# Patient Record
Sex: FEMALE | Race: OTHER | NOT HISPANIC OR LATINO
[De-identification: names, ages, dates, MRNs, and addresses within clinical notes are randomized per-mention and may not be internally consistent; named-entity substitution may affect disease eponyms.]

---

## 2018-02-07 ENCOUNTER — TRANSCRIPTION ENCOUNTER (OUTPATIENT)
Age: 33
End: 2018-02-07

## 2018-05-30 ENCOUNTER — APPOINTMENT (OUTPATIENT)
Dept: RHEUMATOLOGY | Facility: CLINIC | Age: 33
End: 2018-05-30
Payer: MEDICAID

## 2018-05-30 VITALS
SYSTOLIC BLOOD PRESSURE: 130 MMHG | BODY MASS INDEX: 34.68 KG/M2 | HEIGHT: 59 IN | DIASTOLIC BLOOD PRESSURE: 91 MMHG | OXYGEN SATURATION: 98 % | WEIGHT: 172 LBS | RESPIRATION RATE: 16 BRPM | HEART RATE: 76 BPM | TEMPERATURE: 98.2 F

## 2018-05-30 DIAGNOSIS — Z82.61 FAMILY HISTORY OF ARTHRITIS: ICD-10-CM

## 2018-05-30 PROCEDURE — 99204 OFFICE O/P NEW MOD 45 MIN: CPT

## 2018-05-31 LAB
CCP AB SER IA-ACNC: <8 UNITS
CK SERPL-CCNC: 82 U/L
CRP SERPL-MCNC: 1 MG/DL
ERYTHROCYTE [SEDIMENTATION RATE] IN BLOOD BY WESTERGREN METHOD: 13 MM/HR
FERRITIN SERPL-MCNC: 73 NG/ML
FOLATE SERPL-MCNC: >20 NG/ML
IRON SATN MFR SERPL: 14 %
IRON SERPL-MCNC: 48 UG/DL
RF+CCP IGG SER-IMP: NEGATIVE
RHEUMATOID FACT SER QL: 11 IU/ML
TIBC SERPL-MCNC: 351 UG/DL
UIBC SERPL-MCNC: 303 UG/DL
VIT B12 SERPL-MCNC: 396 PG/ML

## 2018-06-03 LAB
DEPRECATED KAPPA LC FREE/LAMBDA SER: 0.6 RATIO
IGA SER QL IEP: 247 MG/DL
IGG SER QL IEP: 1517 MG/DL
IGM SER QL IEP: 82 MG/DL
KAPPA LC CSF-MCNC: 2.37 MG/DL
KAPPA LC SERPL-MCNC: 1.42 MG/DL
VIT C SERPL-MCNC: 0.7 MG/DL

## 2018-06-04 ENCOUNTER — TRANSCRIPTION ENCOUNTER (OUTPATIENT)
Age: 33
End: 2018-06-04

## 2018-06-06 ENCOUNTER — RX RENEWAL (OUTPATIENT)
Age: 33
End: 2018-06-06

## 2018-06-11 ENCOUNTER — APPOINTMENT (OUTPATIENT)
Dept: ENDOCRINOLOGY | Facility: CLINIC | Age: 33
End: 2018-06-11
Payer: MEDICAID

## 2018-06-11 VITALS
DIASTOLIC BLOOD PRESSURE: 70 MMHG | SYSTOLIC BLOOD PRESSURE: 120 MMHG | BODY MASS INDEX: 32.46 KG/M2 | WEIGHT: 161 LBS | OXYGEN SATURATION: 98 % | HEIGHT: 59 IN | HEART RATE: 85 BPM

## 2018-06-11 PROCEDURE — 99205 OFFICE O/P NEW HI 60 MIN: CPT

## 2018-06-14 LAB
ENA RNP AB SER IA-ACNC: <0.2 AL
ENA SM AB SER IA-ACNC: <0.2 AL
ENA SS-A AB SER IA-ACNC: <0.2 AL
ENA SS-B AB SER IA-ACNC: <0.2 AL

## 2018-06-15 LAB
ANA SER IF-ACNC: NEGATIVE
DSDNA AB SER-ACNC: <12 IU/ML

## 2018-06-19 LAB
17OHP SERPL-MCNC: 15 NG/DL
CHOLEST SERPL-MCNC: 241 MG/DL
CHOLEST/HDLC SERPL: 7.3 RATIO
DHEA-SULFATE, SERUM: 189 UG/DL
FSH: 5.8 MIU/ML
HBA1C MFR BLD HPLC: 5.5 %
HDLC SERPL-MCNC: 33 MG/DL
LDLC SERPL CALC-MCNC: 144 MG/DL
LH SERPL-ACNC: 5.6 IU/L
PROLACTIN SERPL-MCNC: 9.4 NG/ML
TESTOST BND SERPL-MCNC: 0.9 PG/ML
TESTOST SERPL-MCNC: 6.4 NG/DL
TRIGL SERPL-MCNC: 320 MG/DL
TSH SERPL-ACNC: 1.65 UIU/ML

## 2018-07-03 ENCOUNTER — APPOINTMENT (OUTPATIENT)
Dept: OBGYN | Facility: CLINIC | Age: 33
End: 2018-07-03
Payer: MEDICAID

## 2018-07-03 VITALS
SYSTOLIC BLOOD PRESSURE: 114 MMHG | BODY MASS INDEX: 32.25 KG/M2 | HEIGHT: 59 IN | WEIGHT: 160 LBS | DIASTOLIC BLOOD PRESSURE: 70 MMHG

## 2018-07-03 PROCEDURE — 99395 PREV VISIT EST AGE 18-39: CPT

## 2018-07-03 PROCEDURE — 36415 COLL VENOUS BLD VENIPUNCTURE: CPT

## 2018-07-04 LAB
ALBUMIN SERPL ELPH-MCNC: 4.5 G/DL
ALP BLD-CCNC: 102 U/L
ALT SERPL-CCNC: 66 U/L
ANION GAP SERPL CALC-SCNC: 18 MMOL/L
AST SERPL-CCNC: 75 U/L
BASOPHILS # BLD AUTO: 0.02 K/UL
BASOPHILS NFR BLD AUTO: 0.2 %
BILIRUB SERPL-MCNC: 0.3 MG/DL
BUN SERPL-MCNC: 8 MG/DL
CALCIUM SERPL-MCNC: 9.7 MG/DL
CHLORIDE SERPL-SCNC: 103 MMOL/L
CO2 SERPL-SCNC: 24 MMOL/L
CREAT SERPL-MCNC: 0.65 MG/DL
EOSINOPHIL # BLD AUTO: 0.35 K/UL
EOSINOPHIL NFR BLD AUTO: 3.9 %
ESTRADIOL SERPL-MCNC: 135 PG/ML
FSH SERPL-MCNC: 2.6 IU/L
GLUCOSE SERPL-MCNC: 78 MG/DL
HBA1C MFR BLD HPLC: 5.3 %
HBV SURFACE AG SER QL: NONREACTIVE
HCT VFR BLD CALC: 38.7 %
HCV AB SER QL: NONREACTIVE
HCV S/CO RATIO: 0.15 S/CO
HGB BLD-MCNC: 13 G/DL
HIV1+2 AB SPEC QL IA.RAPID: NONREACTIVE
IMM GRANULOCYTES NFR BLD AUTO: 0.1 %
INSULIN SERPL-MCNC: 126.7 UU/ML
LH SERPL-ACNC: 7.9 IU/L
LYMPHOCYTES # BLD AUTO: 2.8 K/UL
LYMPHOCYTES NFR BLD AUTO: 31.5 %
MAN DIFF?: NORMAL
MCHC RBC-ENTMCNC: 28.8 PG
MCHC RBC-ENTMCNC: 33.6 GM/DL
MCV RBC AUTO: 85.8 FL
MONOCYTES # BLD AUTO: 0.47 K/UL
MONOCYTES NFR BLD AUTO: 5.3 %
NEUTROPHILS # BLD AUTO: 5.25 K/UL
NEUTROPHILS NFR BLD AUTO: 59 %
PLATELET # BLD AUTO: 224 K/UL
POTASSIUM SERPL-SCNC: 3.9 MMOL/L
PROGEST SERPL-MCNC: 6.7 NG/ML
PROLACTIN SERPL-MCNC: 34.2 NG/ML
PROT SERPL-MCNC: 7.7 G/DL
RBC # BLD: 4.51 M/UL
RBC # FLD: 13.6 %
SODIUM SERPL-SCNC: 145 MMOL/L
T PALLIDUM AB SER QL IA: NEGATIVE
WBC # FLD AUTO: 8.9 K/UL

## 2018-07-05 DIAGNOSIS — E22.1 HYPERPROLACTINEMIA: ICD-10-CM

## 2018-07-05 LAB
C TRACH RRNA SPEC QL NAA+PROBE: NOT DETECTED
HPV HIGH+LOW RISK DNA PNL CVX: NOT DETECTED
N GONORRHOEA RRNA SPEC QL NAA+PROBE: NOT DETECTED
SOURCE AMPLIFICATION: NORMAL
SOURCE TP AMPLIFICATION: NORMAL
T VAGINALIS RRNA SPEC QL NAA+PROBE: NOT DETECTED
T4 FREE SERPL-MCNC: 1.2 NG/DL
TESTOST SERPL-MCNC: 25.9 NG/DL
TSH SERPL-ACNC: 1.61 UIU/ML

## 2018-07-05 RX ORDER — METFORMIN HYDROCHLORIDE 500 MG/1
500 TABLET, COATED ORAL TWICE DAILY
Qty: 120 | Refills: 0 | Status: DISCONTINUED | COMMUNITY
Start: 2018-07-04 | End: 2018-07-05

## 2018-07-06 LAB — DHEA-S SERPL-MCNC: 256 UG/DL

## 2018-07-08 ENCOUNTER — OUTPATIENT (OUTPATIENT)
Dept: OUTPATIENT SERVICES | Facility: HOSPITAL | Age: 33
LOS: 1 days | End: 2018-07-08
Payer: MEDICAID

## 2018-07-08 ENCOUNTER — APPOINTMENT (OUTPATIENT)
Dept: MRI IMAGING | Facility: CLINIC | Age: 33
End: 2018-07-08
Payer: MEDICAID

## 2018-07-08 DIAGNOSIS — E22.1 HYPERPROLACTINEMIA: ICD-10-CM

## 2018-07-08 PROCEDURE — A9585: CPT

## 2018-07-08 PROCEDURE — 70553 MRI BRAIN STEM W/O & W/DYE: CPT | Mod: 26

## 2018-07-08 PROCEDURE — 70553 MRI BRAIN STEM W/O & W/DYE: CPT

## 2018-07-09 LAB — CYTOLOGY CVX/VAG DOC THIN PREP: NORMAL

## 2018-07-24 ENCOUNTER — RX RENEWAL (OUTPATIENT)
Age: 33
End: 2018-07-24

## 2018-07-24 ENCOUNTER — APPOINTMENT (OUTPATIENT)
Dept: OBGYN | Facility: CLINIC | Age: 33
End: 2018-07-24
Payer: MEDICAID

## 2018-07-24 VITALS
SYSTOLIC BLOOD PRESSURE: 120 MMHG | WEIGHT: 162 LBS | DIASTOLIC BLOOD PRESSURE: 80 MMHG | BODY MASS INDEX: 32.66 KG/M2 | HEIGHT: 59 IN

## 2018-07-24 DIAGNOSIS — N91.2 AMENORRHEA, UNSPECIFIED: ICD-10-CM

## 2018-07-24 DIAGNOSIS — Z30.432 ENCOUNTER FOR REMOVAL OF INTRAUTERINE CONTRACEPTIVE DEVICE: ICD-10-CM

## 2018-07-24 DIAGNOSIS — Z30.430 ENCOUNTER FOR INSERTION OF INTRAUTERINE CONTRACEPTIVE DEVICE: ICD-10-CM

## 2018-07-24 PROCEDURE — 81025 URINE PREGNANCY TEST: CPT

## 2018-07-24 PROCEDURE — 76830 TRANSVAGINAL US NON-OB: CPT

## 2018-07-24 PROCEDURE — 58300 INSERT INTRAUTERINE DEVICE: CPT

## 2018-07-25 LAB
ALBUMIN SERPL ELPH-MCNC: 4.4 G/DL
ALDOLASE SERPL-CCNC: 8.5 U/L
ALP BLD-CCNC: 100 U/L
ALT SERPL-CCNC: 45 U/L
AST SERPL-CCNC: 51 U/L
BILIRUB DIRECT SERPL-MCNC: 0.1 MG/DL
BILIRUB INDIRECT SERPL-MCNC: 0.2 MG/DL
BILIRUB SERPL-MCNC: 0.3 MG/DL
CK SERPL-CCNC: 86 U/L
CRP SERPL-MCNC: 0.76 MG/DL
ERYTHROCYTE [SEDIMENTATION RATE] IN BLOOD BY WESTERGREN METHOD: 15 MM/HR
MYOGLOBIN SERPL-MCNC: <21 NG/ML
PROT SERPL-MCNC: 7.3 G/DL
SMOOTH MUSCLE AB SER QL IF: NORMAL

## 2018-07-26 ENCOUNTER — LABORATORY RESULT (OUTPATIENT)
Age: 33
End: 2018-07-26

## 2018-07-26 ENCOUNTER — APPOINTMENT (OUTPATIENT)
Dept: GASTROENTEROLOGY | Facility: CLINIC | Age: 33
End: 2018-07-26
Payer: MEDICAID

## 2018-07-26 VITALS
HEIGHT: 59 IN | DIASTOLIC BLOOD PRESSURE: 91 MMHG | WEIGHT: 162 LBS | HEART RATE: 72 BPM | SYSTOLIC BLOOD PRESSURE: 125 MMHG | BODY MASS INDEX: 32.66 KG/M2

## 2018-07-26 DIAGNOSIS — Z83.79 FAMILY HISTORY OF OTHER DISEASES OF THE DIGESTIVE SYSTEM: ICD-10-CM

## 2018-07-26 PROBLEM — Z30.430 ENCOUNTER FOR IUD INSERTION: Status: ACTIVE | Noted: 2018-07-26

## 2018-07-26 LAB
HCG UR QL: NEGATIVE
QUALITY CONTROL: YES

## 2018-07-26 PROCEDURE — 99205 OFFICE O/P NEW HI 60 MIN: CPT | Mod: 25

## 2018-07-26 PROCEDURE — 36415 COLL VENOUS BLD VENIPUNCTURE: CPT

## 2018-07-26 RX ORDER — CHROMIUM 200 MCG
TABLET ORAL
Refills: 0 | Status: DISCONTINUED | COMMUNITY
End: 2018-07-26

## 2018-08-03 LAB
A1AT SERPL-MCNC: 133 MG/DL
CERULOPLASMIN SERPL-MCNC: 45 MG/DL
MITOCHONDRIA AB SER IF-ACNC: NORMAL
TTG IGA SER IA-ACNC: <5 UNITS
TTG IGA SER-ACNC: NEGATIVE
TTG IGG SER IA-ACNC: <5 UNITS
TTG IGG SER IA-ACNC: NEGATIVE

## 2018-09-04 ENCOUNTER — APPOINTMENT (OUTPATIENT)
Dept: OBGYN | Facility: CLINIC | Age: 33
End: 2018-09-04

## 2018-12-11 ENCOUNTER — APPOINTMENT (OUTPATIENT)
Dept: ENDOCRINOLOGY | Facility: CLINIC | Age: 33
End: 2018-12-11

## 2019-04-13 ENCOUNTER — TRANSCRIPTION ENCOUNTER (OUTPATIENT)
Age: 34
End: 2019-04-13

## 2020-02-25 ENCOUNTER — APPOINTMENT (OUTPATIENT)
Dept: OBGYN | Facility: CLINIC | Age: 35
End: 2020-02-25
Payer: COMMERCIAL

## 2020-02-25 VITALS
WEIGHT: 169 LBS | BODY MASS INDEX: 34.07 KG/M2 | SYSTOLIC BLOOD PRESSURE: 126 MMHG | HEIGHT: 59 IN | DIASTOLIC BLOOD PRESSURE: 78 MMHG

## 2020-02-25 DIAGNOSIS — Z86.19 PERSONAL HISTORY OF OTHER INFECTIOUS AND PARASITIC DISEASES: ICD-10-CM

## 2020-02-25 DIAGNOSIS — Z01.419 ENCOUNTER FOR GYNECOLOGICAL EXAMINATION (GENERAL) (ROUTINE) W/OUT ABNORMAL FINDINGS: ICD-10-CM

## 2020-02-25 DIAGNOSIS — Z30.9 ENCOUNTER FOR CONTRACEPTIVE MANAGEMENT, UNSPECIFIED: ICD-10-CM

## 2020-02-25 PROCEDURE — 36415 COLL VENOUS BLD VENIPUNCTURE: CPT

## 2020-02-25 PROCEDURE — 99395 PREV VISIT EST AGE 18-39: CPT

## 2020-02-28 LAB
ALBUMIN SERPL ELPH-MCNC: 4.5 G/DL
ALP BLD-CCNC: 102 U/L
ALT SERPL-CCNC: 55 U/L
ANION GAP SERPL CALC-SCNC: 14 MMOL/L
AST SERPL-CCNC: 53 U/L
BASOPHILS # BLD AUTO: 0.05 K/UL
BASOPHILS NFR BLD AUTO: 0.5 %
BILIRUB SERPL-MCNC: 0.2 MG/DL
BUN SERPL-MCNC: 11 MG/DL
C TRACH RRNA SPEC QL NAA+PROBE: NOT DETECTED
CALCIUM SERPL-MCNC: 9.4 MG/DL
CHLORIDE SERPL-SCNC: 105 MMOL/L
CO2 SERPL-SCNC: 23 MMOL/L
CREAT SERPL-MCNC: 0.73 MG/DL
DHEA-S SERPL-MCNC: 224 UG/DL
EOSINOPHIL # BLD AUTO: 0.19 K/UL
EOSINOPHIL NFR BLD AUTO: 1.9 %
ESTIMATED AVERAGE GLUCOSE: 108 MG/DL
ESTRADIOL SERPL-MCNC: 59 PG/ML
FSH SERPL-MCNC: 5.9 IU/L
GLUCOSE SERPL-MCNC: 105 MG/DL
HBA1C MFR BLD HPLC: 5.4 %
HBV SURFACE AG SER QL: NONREACTIVE
HCT VFR BLD CALC: 43.6 %
HCV AB SER QL: NONREACTIVE
HCV S/CO RATIO: 0.2 S/CO
HGB BLD-MCNC: 14.2 G/DL
HIV1+2 AB SPEC QL IA.RAPID: NONREACTIVE
HPV HIGH+LOW RISK DNA PNL CVX: NOT DETECTED
IMM GRANULOCYTES NFR BLD AUTO: 0.3 %
INSULIN SERPL-MCNC: 147 UU/ML
LH SERPL-ACNC: 5.7 IU/L
LYMPHOCYTES # BLD AUTO: 3.42 K/UL
LYMPHOCYTES NFR BLD AUTO: 34.4 %
MAN DIFF?: NORMAL
MCHC RBC-ENTMCNC: 28.3 PG
MCHC RBC-ENTMCNC: 32.6 GM/DL
MCV RBC AUTO: 86.9 FL
MONOCYTES # BLD AUTO: 0.72 K/UL
MONOCYTES NFR BLD AUTO: 7.3 %
N GONORRHOEA RRNA SPEC QL NAA+PROBE: NOT DETECTED
NEUTROPHILS # BLD AUTO: 5.52 K/UL
NEUTROPHILS NFR BLD AUTO: 55.6 %
PLATELET # BLD AUTO: 250 K/UL
POTASSIUM SERPL-SCNC: 4.2 MMOL/L
PROGEST SERPL-MCNC: 0.1 NG/ML
PROLACTIN SERPL-MCNC: 20.6 NG/ML
PROT SERPL-MCNC: 7.6 G/DL
RBC # BLD: 5.02 M/UL
RBC # FLD: 13.2 %
SODIUM SERPL-SCNC: 142 MMOL/L
SOURCE AMPLIFICATION: NORMAL
SOURCE TP AMPLIFICATION: NORMAL
T PALLIDUM AB SER QL IA: NEGATIVE
T VAGINALIS RRNA SPEC QL NAA+PROBE: NOT DETECTED
T4 FREE SERPL-MCNC: 1.3 NG/DL
TESTOST SERPL-MCNC: 14.1 NG/DL
TSH SERPL-ACNC: 2.07 UIU/ML
WBC # FLD AUTO: 9.93 K/UL

## 2020-02-28 NOTE — REVIEW OF SYSTEMS
[Feeling Tired] : feeling tired [Recent Wt Gain ___ Lbs] : recent [unfilled] ~Ulb weight gain [Joint Pain] : joint pain [Nl] : Hematologic/Lymphatic

## 2020-02-28 NOTE — PHYSICAL EXAM
[Awake] : awake [Alert] : alert [Acute Distress] : no acute distress [Mass] : no breast mass [Nipple Discharge] : no nipple discharge [Axillary LAD] : no axillary lymphadenopathy [Soft] : soft [Tender] : non tender [Oriented x3] : oriented to person, place, and time [Normal] : uterus [Moderate] : moderate [Discharge] : a  ~M vaginal discharge was present [White] : white [Cheesy] : cheesy [No Bleeding] : there was no active vaginal bleeding [Uterine Adnexae] : were not tender and not enlarged [External Hemorrhoid] : no external hemorrhoids

## 2020-02-29 LAB — CYTOLOGY CVX/VAG DOC THIN PREP: NORMAL

## 2020-03-23 ENCOUNTER — APPOINTMENT (OUTPATIENT)
Dept: OBGYN | Facility: CLINIC | Age: 35
End: 2020-03-23
Payer: COMMERCIAL

## 2020-03-23 VITALS
BODY MASS INDEX: 33.67 KG/M2 | WEIGHT: 167 LBS | DIASTOLIC BLOOD PRESSURE: 82 MMHG | SYSTOLIC BLOOD PRESSURE: 122 MMHG | HEIGHT: 59 IN

## 2020-03-23 DIAGNOSIS — Z30.432 ENCOUNTER FOR REMOVAL OF INTRAUTERINE CONTRACEPTIVE DEVICE: ICD-10-CM

## 2020-03-23 LAB
HCG UR QL: NEGATIVE
QUALITY CONTROL: YES

## 2020-03-23 PROCEDURE — 58301 REMOVE INTRAUTERINE DEVICE: CPT

## 2020-03-23 PROCEDURE — 81025 URINE PREGNANCY TEST: CPT

## 2020-03-23 RX ORDER — TERCONAZOLE 8 MG/G
0.8 CREAM VAGINAL
Qty: 1 | Refills: 0 | Status: DISCONTINUED | COMMUNITY
Start: 2020-02-25 | End: 2020-03-23

## 2020-03-23 NOTE — PROCEDURE
[IUD Removal] : IUD [Mirena] : Mirena [DUB] : dysfunctional uterine bleeding [Patient] : patient [Risks] : risks [Benefits] : benefits [Alternatives] : alternatives [Consent Obtained] : consent was obtained prior to the procedure and is detailed in the patient's record [Speculum Placed] : a speculum was placed in the vagina [Strings Visualized] : the IUD strings were visualized [IUD Removed - Forceps] : the strings were grasped with forceps and the IUD was removed [IUD Discarded] : discarded [Tolerated Well] : the patient tolerated the procedure well [No Complications] : none [Heavy Vaginal Bleeding] : for heavy vaginal bleeding [Pelvic Pain] : for pelvic pain [PRN] : as needed

## 2020-10-20 ENCOUNTER — APPOINTMENT (OUTPATIENT)
Dept: ORTHOPEDIC SURGERY | Facility: CLINIC | Age: 35
End: 2020-10-20
Payer: COMMERCIAL

## 2020-10-20 VITALS — WEIGHT: 173 LBS | HEIGHT: 59 IN | BODY MASS INDEX: 34.88 KG/M2

## 2020-10-20 PROCEDURE — 73110 X-RAY EXAM OF WRIST: CPT | Mod: LT

## 2020-10-20 PROCEDURE — 20612 ASPIRATE/INJ GANGLION CYST: CPT | Mod: LT

## 2020-10-20 PROCEDURE — 99072 ADDL SUPL MATRL&STAF TM PHE: CPT

## 2020-10-20 PROCEDURE — 99203 OFFICE O/P NEW LOW 30 MIN: CPT | Mod: 25

## 2020-12-23 PROBLEM — Z86.19 HISTORY OF CANDIDIASIS OF VAGINA: Status: RESOLVED | Noted: 2020-02-25 | Resolved: 2020-12-23

## 2020-12-28 ENCOUNTER — APPOINTMENT (OUTPATIENT)
Dept: GASTROENTEROLOGY | Facility: CLINIC | Age: 35
End: 2020-12-28
Payer: COMMERCIAL

## 2020-12-28 VITALS
BODY MASS INDEX: 34.07 KG/M2 | WEIGHT: 169 LBS | HEART RATE: 78 BPM | HEIGHT: 59 IN | DIASTOLIC BLOOD PRESSURE: 90 MMHG | SYSTOLIC BLOOD PRESSURE: 144 MMHG | TEMPERATURE: 96.9 F

## 2020-12-28 DIAGNOSIS — R10.13 EPIGASTRIC PAIN: ICD-10-CM

## 2020-12-28 PROCEDURE — 36415 COLL VENOUS BLD VENIPUNCTURE: CPT

## 2020-12-28 PROCEDURE — 99072 ADDL SUPL MATRL&STAF TM PHE: CPT

## 2020-12-28 PROCEDURE — 99214 OFFICE O/P EST MOD 30 MIN: CPT | Mod: 25

## 2020-12-28 NOTE — HISTORY OF PRESENT ILLNESS
[Heartburn] : denies heartburn [Nausea] : denies nausea [Vomiting] : denies vomiting [Diarrhea] : denies diarrhea [Constipation] : denies constipation [Yellow Skin Or Eyes (Jaundice)] : denies jaundice [Abdominal Pain] : stable abdominal pain [Abdominal Swelling] : denies abdominal swelling [Rectal Pain] : denies rectal pain [_________] : Performed [unfilled] [de-identified] : Shante presents to the office today for evaluation of elevated liver enzymes.  She was last seen in the office for similar reasons in 2018.\par \par In 2018, she was noted have mildly elevated liver enzymes (AST, ALT: 40-70s) since 2016 which was attributed to NAFLD.  In February 2020, her AST was noted to be 53 with an ALT of 55.\par \par In October 2020, she went to see her PCP when she was having nausea and epigastric pain.  On October 1, she was found to have AST: 598, ALT: 409, Alk Phos: 192 with normal bilirubin.  On October 14, her enzymes mildly improved with AST: 506, ALT: 399 and Alk Phos: 177.  In November 2020, she underwent an abdominal US which was normal.  The patient has been using famotidine 40 mg at bedtime and reports that her upper abdominal pain has improved.  She denies any new diet, medication changes, herbal supplements, or excessive alcohol use.  Her weight has been relatively stable.  She uses intermittent Excedrin for migraines and stopped taking OCPs in February 2020.  On ROS, the patient does report subjective low grade fevers, fatigue, joint pain and stiffness. [de-identified] : normal liver, no gallstones

## 2020-12-28 NOTE — ASSESSMENT
[FreeTextEntry1] : 1.  Elevated liver enzymes with recent normal abdominal US.  History of fatty liver.  Previous workup unremarkable.  Acute hepatitis may be due to viral etiology.  Thrombosis, autoimmune disease less likely.\par 2.  Epigastric pain, likely secondary to GERD with esophagitis. DIfferential includes functional dyspepsia, symptomatic hiatus hernia, peptic ulcer, biliary colic (no stones seen on US).\par 3.  Obesity.\par 4.  History of PCOS.\par \par Recs:\par - Recent labs and US reviewed.\par - Liver enzymes, autoimmune labs repeated today.\par - The patient was counseled on diet and exercise for weight loss.\par - If liver enzymes remain elevated with negative workup, will check US with doppler to rule out PV/HV thombus.\par - The patient was counseled at length regarding fatty liver disease.  The spectrum of disease as well as the risk of disease progression to cirrhosis and its complications was reviewed.  The patient was advised that fatty liver disease may develop liver cancer without cirrhosis and therefore should be screened yearly with an abdominal US.  Fatty liver disease is commonly seen in patients with diabetes or those who are overweight.  The patient was advised that fatty liver may also be a precursor to the development of diabetes as it is part of the metabolic syndrome.  Treatment was reviewed and she was advised that the best therapy is diet and exercise.  The avoidance of fatty foods and alcohol was stressed, and a heart healthy diet was advised.\par

## 2020-12-28 NOTE — PHYSICAL EXAM
[General Appearance - Alert] : alert [General Appearance - In No Acute Distress] : in no acute distress [General Appearance - Well Nourished] : well nourished [Sclera] : the sclera and conjunctiva were normal [PERRL With Normal Accommodation] : pupils were equal in size, round, and reactive to light [Extraocular Movements] : extraocular movements were intact [Outer Ear] : the ears and nose were normal in appearance [Hearing Threshold Finger Rub Not [place]] : hearing was normal [Neck Appearance] : the appearance of the neck was normal [Neck Cervical Mass (___cm)] : no neck mass was observed [Auscultation Breath Sounds / Voice Sounds] : lungs were clear to auscultation bilaterally [Heart Rate And Rhythm] : heart rate was normal and rhythm regular [Heart Sounds] : normal S1 and S2 [Edema] : there was no peripheral edema [Bowel Sounds] : normal bowel sounds [Abdomen Soft] : soft [] : no hepato-splenomegaly [Abdomen Mass (___ Cm)] : no abdominal mass palpated [Abdomen Hernia] : no hernia was discovered [Cervical Lymph Nodes Enlarged Anterior Bilaterally] : anterior cervical [Supraclavicular Lymph Nodes Enlarged Bilaterally] : supraclavicular [No CVA Tenderness] : no ~M costovertebral angle tenderness [Abnormal Walk] : normal gait [Skin Color & Pigmentation] : normal skin color and pigmentation [No Focal Deficits] : no focal deficits [Oriented To Time, Place, And Person] : oriented to person, place, and time [FreeTextEntry1] : mild epigastric tenderness

## 2020-12-29 LAB
SARS-COV-2 IGG SERPL IA-ACNC: 0.06 INDEX
SARS-COV-2 IGG SERPL QL IA: NEGATIVE

## 2020-12-30 LAB
ALBUMIN SERPL ELPH-MCNC: 4.4 G/DL
ALP BLD-CCNC: 188 U/L
ALT SERPL-CCNC: 184 U/L
ANA PAT FLD IF-IMP: ABNORMAL
ANA SER IF-ACNC: ABNORMAL
AST SERPL-CCNC: 229 U/L
BILIRUB DIRECT SERPL-MCNC: 0.1 MG/DL
BILIRUB INDIRECT SERPL-MCNC: 0.2 MG/DL
BILIRUB SERPL-MCNC: 0.3 MG/DL
GGT SERPL-CCNC: 117 U/L
LKM AB SER QL IF: <20.1 UNITS
PROT SERPL-MCNC: 8 G/DL
TSH SERPL-ACNC: 3.47 UIU/ML
TTG IGA SER IA-ACNC: <1.2 U/ML
TTG IGA SER-ACNC: NEGATIVE
TTG IGG SER IA-ACNC: 8.5 U/ML
TTG IGG SER IA-ACNC: ABNORMAL

## 2021-01-04 LAB
MITOCHONDRIA AB SER IF-ACNC: NORMAL
SMOOTH MUSCLE AB SER QL IF: ABNORMAL

## 2021-02-03 ENCOUNTER — APPOINTMENT (OUTPATIENT)
Dept: RHEUMATOLOGY | Facility: CLINIC | Age: 36
End: 2021-02-03
Payer: COMMERCIAL

## 2021-02-03 VITALS
SYSTOLIC BLOOD PRESSURE: 124 MMHG | TEMPERATURE: 97.1 F | WEIGHT: 157 LBS | RESPIRATION RATE: 16 BRPM | BODY MASS INDEX: 31.65 KG/M2 | OXYGEN SATURATION: 98 % | DIASTOLIC BLOOD PRESSURE: 72 MMHG | HEART RATE: 79 BPM | HEIGHT: 59 IN

## 2021-02-03 DIAGNOSIS — M79.10 MYALGIA, UNSPECIFIED SITE: ICD-10-CM

## 2021-02-03 DIAGNOSIS — L65.9 NONSCARRING HAIR LOSS, UNSPECIFIED: ICD-10-CM

## 2021-02-03 PROCEDURE — 99214 OFFICE O/P EST MOD 30 MIN: CPT

## 2021-02-03 PROCEDURE — 99072 ADDL SUPL MATRL&STAF TM PHE: CPT

## 2021-02-05 ENCOUNTER — NON-APPOINTMENT (OUTPATIENT)
Age: 36
End: 2021-02-05

## 2021-02-05 PROBLEM — M79.10 MYALGIA: Status: ACTIVE | Noted: 2018-05-30

## 2021-02-05 PROBLEM — L65.9 HAIR LOSS: Status: ACTIVE | Noted: 2021-02-05

## 2021-02-18 ENCOUNTER — TRANSCRIPTION ENCOUNTER (OUTPATIENT)
Age: 36
End: 2021-02-18

## 2021-02-19 ENCOUNTER — NON-APPOINTMENT (OUTPATIENT)
Age: 36
End: 2021-02-19

## 2021-02-19 LAB
ACE BLD-CCNC: 24 U/L
ALBUMIN MFR SERPL ELPH: 57.5 %
ALBUMIN SERPL ELPH-MCNC: 4.8 G/DL
ALBUMIN SERPL-MCNC: 4.5 G/DL
ALBUMIN/GLOB SERPL: 1.4 RATIO
ALDOLASE SERPL-CCNC: 10.5 U/L
ALP BLD-CCNC: 113 U/L
ALPHA1 GLOB MFR SERPL ELPH: 3.2 %
ALPHA1 GLOB SERPL ELPH-MCNC: 0.2 G/DL
ALPHA2 GLOB MFR SERPL ELPH: 9.4 %
ALPHA2 GLOB SERPL ELPH-MCNC: 0.7 G/DL
ALT SERPL-CCNC: 68 U/L
ANA PAT FLD IF-IMP: ABNORMAL
ANA SER IF-ACNC: ABNORMAL
ANION GAP SERPL CALC-SCNC: 17 MMOL/L
APPEARANCE: CLEAR
AST SERPL-CCNC: 94 U/L
B-GLOBULIN MFR SERPL ELPH: 12.4 %
B-GLOBULIN SERPL ELPH-MCNC: 1 G/DL
BASOPHILS # BLD AUTO: 0.06 K/UL
BASOPHILS NFR BLD AUTO: 0.8 %
BILIRUB SERPL-MCNC: 0.4 MG/DL
BILIRUBIN URINE: NEGATIVE
BLOOD URINE: NEGATIVE
BUN SERPL-MCNC: 7 MG/DL
C3 SERPL-MCNC: 163 MG/DL
C4 SERPL-MCNC: 24 MG/DL
CALCIUM SERPL-MCNC: 10.1 MG/DL
CCP AB SER IA-ACNC: <8 UNITS
CHLORIDE SERPL-SCNC: 102 MMOL/L
CK SERPL-CCNC: 122 U/L
CO2 SERPL-SCNC: 23 MMOL/L
COLOR: COLORLESS
CREAT SERPL-MCNC: 0.77 MG/DL
CREAT SPEC-SCNC: 54 MG/DL
CREAT/PROT UR: 0.1 RATIO
CRP SERPL-MCNC: 0.72 MG/DL
DEPRECATED KAPPA LC FREE/LAMBDA SER: 0.68 RATIO
DSDNA AB SER-ACNC: <12 IU/ML
ENA RNP AB SER IA-ACNC: 0.3 AL
ENA SCL70 IGG SER IA-ACNC: <0.2 AL
ENA SM AB SER IA-ACNC: <0.2 AL
ENA SS-A AB SER IA-ACNC: <0.2 AL
ENA SS-B AB SER IA-ACNC: <0.2 AL
ENDOMYSIUM IGA SER QL: NEGATIVE
ENDOMYSIUM IGA TITR SER: NORMAL
EOSINOPHIL # BLD AUTO: 0.16 K/UL
EOSINOPHIL NFR BLD AUTO: 2 %
ERYTHROCYTE [SEDIMENTATION RATE] IN BLOOD BY WESTERGREN METHOD: 39 MM/HR
GAMMA GLOB FLD ELPH-MCNC: 1.4 G/DL
GAMMA GLOB MFR SERPL ELPH: 17.5 %
GGT SERPL-CCNC: 58 U/L
GLIADIN IGA SER QL: 5.6 UNITS
GLIADIN IGG SER QL: <5 UNITS
GLIADIN PEPTIDE IGA SER-ACNC: NEGATIVE
GLIADIN PEPTIDE IGG SER-ACNC: NEGATIVE
GLUCOSE QUALITATIVE U: NEGATIVE
GLUCOSE SERPL-MCNC: 58 MG/DL
HCT VFR BLD CALC: 43.3 %
HGB BLD-MCNC: 13.7 G/DL
IGA SER QL IEP: 272 MG/DL
IGG SER QL IEP: 1370 MG/DL
IGM SER QL IEP: 84 MG/DL
IMM GRANULOCYTES NFR BLD AUTO: 0.4 %
INTERPRETATION SERPL IEP-IMP: NORMAL
KAPPA LC CSF-MCNC: 2.7 MG/DL
KAPPA LC SERPL-MCNC: 1.84 MG/DL
KETONES URINE: NEGATIVE
LEUKOCYTE ESTERASE URINE: NEGATIVE
LYMPHOCYTES # BLD AUTO: 2.77 K/UL
LYMPHOCYTES NFR BLD AUTO: 34.9 %
M PROTEIN SPEC IFE-MCNC: NORMAL
MAN DIFF?: NORMAL
MCHC RBC-ENTMCNC: 27.8 PG
MCHC RBC-ENTMCNC: 31.6 GM/DL
MCV RBC AUTO: 87.8 FL
MONOCYTES # BLD AUTO: 0.51 K/UL
MONOCYTES NFR BLD AUTO: 6.4 %
MYOGLOBIN SERPL-MCNC: <21 NG/ML
NEUTROPHILS # BLD AUTO: 4.4 K/UL
NEUTROPHILS NFR BLD AUTO: 55.5 %
NITRITE URINE: NEGATIVE
PH URINE: 7
PLATELET # BLD AUTO: 231 K/UL
POTASSIUM SERPL-SCNC: 4.5 MMOL/L
PROT SERPL-MCNC: 7.8 G/DL
PROT UR-MCNC: 6 MG/DL
PROTEIN URINE: NEGATIVE
RBC # BLD: 4.93 M/UL
RBC # FLD: 12.5 %
RF+CCP IGG SER-IMP: NEGATIVE
RHEUMATOID FACT SER QL: <10 IU/ML
SMOOTH MUSCLE AB SER QL IF: ABNORMAL
SODIUM SERPL-SCNC: 142 MMOL/L
SPECIFIC GRAVITY URINE: 1.01
TSH SERPL-ACNC: 1.72 UIU/ML
TTG IGA SER IA-ACNC: 1.6 U/ML
TTG IGA SER-ACNC: NEGATIVE
TTG IGG SER IA-ACNC: 11.9 U/ML
TTG IGG SER IA-ACNC: POSITIVE
UROBILINOGEN URINE: NORMAL
WBC # FLD AUTO: 7.93 K/UL

## 2021-03-11 ENCOUNTER — APPOINTMENT (OUTPATIENT)
Dept: HEPATOLOGY | Facility: CLINIC | Age: 36
End: 2021-03-11
Payer: COMMERCIAL

## 2021-03-11 VITALS
OXYGEN SATURATION: 96 % | WEIGHT: 150.2 LBS | RESPIRATION RATE: 16 BRPM | SYSTOLIC BLOOD PRESSURE: 134 MMHG | TEMPERATURE: 98.4 F | HEART RATE: 100 BPM | HEIGHT: 59 IN | BODY MASS INDEX: 30.28 KG/M2 | DIASTOLIC BLOOD PRESSURE: 89 MMHG

## 2021-03-11 PROCEDURE — 99214 OFFICE O/P EST MOD 30 MIN: CPT

## 2021-03-11 PROCEDURE — 99072 ADDL SUPL MATRL&STAF TM PHE: CPT

## 2021-03-11 RX ORDER — PHENDIMETRAZINE TARTRATE 35 MG/1
35 TABLET ORAL
Refills: 0 | Status: ACTIVE | COMMUNITY

## 2021-03-11 RX ORDER — NORETHINDRONE ACETATE AND ETHINYL ESTRADIOL AND FERROUS FUMARATE 1.5-30(21)
1.5-3 KIT ORAL DAILY
Qty: 28 | Refills: 5 | Status: DISCONTINUED | COMMUNITY
Start: 2020-02-25 | End: 2021-03-11

## 2021-03-11 NOTE — ASSESSMENT
[FreeTextEntry1] : Ms. NOVAK is a 35 year old woman with\par \par - elevated liver enzymes with recent increase\par - elevated BROOKE and ASMA, normal IgG\par - obesity, lost 16 lbs since December\par - PCOS\par - liver not fatty-appearing on US\par - mild epigastric pain and tenderness, does not want to try PPI\par - took nitrofurantoin in 2018 ,which can cause autoimmune hepatitis, birth control Junel until early 2020. No recent medication to explain rise of LFTs. PHendimetrazine was started after peak of LFTs.\par - no FHx of liver disease. No neurologic/psychiatric illness. \par \par Plan:\par - labs as below\par - liver biopsy\par - return in 1 month

## 2021-03-11 NOTE — REVIEW OF SYSTEMS
[As Noted in HPI] : as noted in HPI [Feeling Tired] : feeling tired [Abdominal Pain] : abdominal pain [Constipation] : constipation [Diarrhea] : diarrhea [Negative] : Respiratory

## 2021-03-11 NOTE — PHYSICAL EXAM
[General Appearance - Alert] : alert [General Appearance - In No Acute Distress] : in no acute distress [Sclera] : the sclera and conjunctiva were normal [PERRL With Normal Accommodation] : pupils were equal in size, round, and reactive to light [Extraocular Movements] : extraocular movements were intact [Outer Ear] : the ears and nose were normal in appearance [Oropharynx] : the oropharynx was normal [Neck Appearance] : the appearance of the neck was normal [Neck Cervical Mass (___cm)] : no neck mass was observed [Jugular Venous Distention Increased] : there was no jugular-venous distention [Thyroid Diffuse Enlargement] : the thyroid was not enlarged [Thyroid Nodule] : there were no palpable thyroid nodules [Auscultation Breath Sounds / Voice Sounds] : lungs were clear to auscultation bilaterally [Heart Rate And Rhythm] : heart rate was normal and rhythm regular [Heart Sounds] : normal S1 and S2 [Heart Sounds Gallop] : no gallops [Murmurs] : no murmurs [Heart Sounds Pericardial Friction Rub] : no pericardial rub [Full Pulse] : the pedal pulses are present [Edema] : there was no peripheral edema [Bowel Sounds] : normal bowel sounds [Abdomen Soft] : soft [Abdomen Tenderness] : non-tender [Abdomen Mass (___ Cm)] : no abdominal mass palpated [Cervical Lymph Nodes Enlarged Posterior Bilaterally] : posterior cervical [Cervical Lymph Nodes Enlarged Anterior Bilaterally] : anterior cervical [Supraclavicular Lymph Nodes Enlarged Bilaterally] : supraclavicular [Axillary Lymph Nodes Enlarged Bilaterally] : axillary [Femoral Lymph Nodes Enlarged Bilaterally] : femoral [Inguinal Lymph Nodes Enlarged Bilaterally] : inguinal [No CVA Tenderness] : no ~M costovertebral angle tenderness [No Spinal Tenderness] : no spinal tenderness [Skin Color & Pigmentation] : normal skin color and pigmentation [Skin Turgor] : normal skin turgor [] : no rash [Deep Tendon Reflexes (DTR)] : deep tendon reflexes were 2+ and symmetric [Sensation] : the sensory exam was normal to light touch and pinprick [No Focal Deficits] : no focal deficits [Oriented To Time, Place, And Person] : oriented to person, place, and time [Impaired Insight] : insight and judgment were intact [Affect] : the affect was normal [Abdominal  Ascites] : no ascites [Asterixis] : no asterixis observed [Jaundice] : No jaundice [Palmar Erythema] : no Palmar Erythema [Depression] : no depression

## 2021-03-11 NOTE — HISTORY OF PRESENT ILLNESS
[de-identified] : Ms. NOVAK is a 35 year old woman with PCOS, obesity BMI 30, polyarthralgia, who was referred by her rheumatologist, because of elevated AST/ALT that increased to max 251/192 on 1/7/21; improved to 94/68 on 2/3/21; were <75 since 2016. She saw Dr. Caldwell in 2018, when an US was normal, serologies for hepatitis B and C were negative. BROOKE and ASMA elevated, but IgG normal. She saw him again recently. Repeat US Nov again normal. started phendimetrazine in Jan 2021 for weight loss, lost 16 lbs since December.\par She s.t. has pruritus on thighs and lower abdomen, mildly on her knuckles on occasion - when skin dry.\par She took nitrofurantoin in 2016.\par She has epigastric pain and s.t. diarrhea and constipation.\par \par HBsAg, HCV Ab negative 2/25/20, BROOKE 1:320, ASMA 1:40, IgG normal in 2018 and recently. AMA, TTG nl, ceruloplasmin 45 7/2018\par \par Weight hx: 150 lbs, BMI 30 in 3/2021 after 16 lbs weight loss in Dec 2020. Max weight was 173 lbs in Oct 2020, after weight gain from 140s-150s until 2015 (BMI 28-30). Was slim as teenager.\par Alcohol hx: rarely. Never more.\par Meds: VitD. Stopped Junel birth control in early 2020. She took nitrofurantoin in 2018 for UTI. Took Metformin in 2018 for PCOS.\par \par Workup:\par - 2/3/21 TTG negative, IgA normal.\par - 11/12/21 US abdomen (Emma, report seen on pt's phone): normal exam.\par - 4/24/18 US (Emma): normal exam.

## 2021-03-12 LAB
ALBUMIN SERPL ELPH-MCNC: 4.6 G/DL
ALP BLD-CCNC: 112 U/L
ALT SERPL-CCNC: 41 U/L
ANION GAP SERPL CALC-SCNC: 12 MMOL/L
AST SERPL-CCNC: 65 U/L
BASOPHILS # BLD AUTO: 0.04 K/UL
BASOPHILS NFR BLD AUTO: 0.4 %
BILIRUB SERPL-MCNC: 0.6 MG/DL
BUN SERPL-MCNC: 7 MG/DL
CALCIUM SERPL-MCNC: 9.8 MG/DL
CERULOPLASMIN SERPL-MCNC: 35 MG/DL
CHLORIDE SERPL-SCNC: 102 MMOL/L
CHOLEST SERPL-MCNC: 237 MG/DL
CO2 SERPL-SCNC: 24 MMOL/L
CREAT SERPL-MCNC: 0.65 MG/DL
EOSINOPHIL # BLD AUTO: 0.14 K/UL
EOSINOPHIL NFR BLD AUTO: 1.6 %
ESTIMATED AVERAGE GLUCOSE: 103 MG/DL
FERRITIN SERPL-MCNC: 80 NG/ML
GLUCOSE SERPL-MCNC: 88 MG/DL
HBA1C MFR BLD HPLC: 5.2 %
HCT VFR BLD CALC: 43.5 %
HDLC SERPL-MCNC: 41 MG/DL
HGB BLD-MCNC: 14.5 G/DL
IMM GRANULOCYTES NFR BLD AUTO: 0.3 %
INR PPP: 1.08 RATIO
IRON SATN MFR SERPL: 23 %
IRON SERPL-MCNC: 89 UG/DL
LDLC SERPL CALC-MCNC: 167 MG/DL
LYMPHOCYTES # BLD AUTO: 2.49 K/UL
LYMPHOCYTES NFR BLD AUTO: 27.9 %
MAN DIFF?: NORMAL
MCHC RBC-ENTMCNC: 29 PG
MCHC RBC-ENTMCNC: 33.3 GM/DL
MCV RBC AUTO: 87 FL
MONOCYTES # BLD AUTO: 0.47 K/UL
MONOCYTES NFR BLD AUTO: 5.3 %
NEUTROPHILS # BLD AUTO: 5.76 K/UL
NEUTROPHILS NFR BLD AUTO: 64.5 %
NONHDLC SERPL-MCNC: 196 MG/DL
PLATELET # BLD AUTO: 228 K/UL
POTASSIUM SERPL-SCNC: 4.4 MMOL/L
PROT SERPL-MCNC: 7.8 G/DL
PT BLD: 12.8 SEC
RBC # BLD: 5 M/UL
RBC # FLD: 12.9 %
SODIUM SERPL-SCNC: 138 MMOL/L
TIBC SERPL-MCNC: 385 UG/DL
TRIGL SERPL-MCNC: 143 MG/DL
UIBC SERPL-MCNC: 296 UG/DL
WBC # FLD AUTO: 8.93 K/UL

## 2021-03-15 LAB
DEPRECATED KAPPA LC FREE/LAMBDA SER: 0.56 RATIO
HBV CORE IGG+IGM SER QL: NONREACTIVE
HBV SURFACE AB SER QL: REACTIVE
HBV SURFACE AG SER QL: NONREACTIVE
HCV AB SER QL: NONREACTIVE
HCV S/CO RATIO: 0.12 S/CO
HEPATITIS A IGG ANTIBODY: NONREACTIVE
IGA SER QL IEP: 305 MG/DL
IGG SER QL IEP: 1588 MG/DL
IGM SER QL IEP: 98 MG/DL
KAPPA LC CSF-MCNC: 3.28 MG/DL
KAPPA LC SERPL-MCNC: 1.85 MG/DL
SMOOTH MUSCLE AB SER QL IF: NORMAL

## 2021-03-16 ENCOUNTER — NON-APPOINTMENT (OUTPATIENT)
Age: 36
End: 2021-03-16

## 2021-03-16 ENCOUNTER — APPOINTMENT (OUTPATIENT)
Dept: ORTHOPEDIC SURGERY | Facility: CLINIC | Age: 36
End: 2021-03-16

## 2021-03-16 LAB — PHOSPHATIDYETHANOL (PETH), WHOLE BLOOD: NEGATIVE NG/ML

## 2021-03-18 LAB
A1AT PHENOTYP SERPL-IMP: NORMAL
A1AT SERPL-MCNC: 142 MG/DL

## 2021-04-06 ENCOUNTER — APPOINTMENT (OUTPATIENT)
Dept: ORTHOPEDIC SURGERY | Facility: CLINIC | Age: 36
End: 2021-04-06

## 2021-04-15 ENCOUNTER — APPOINTMENT (OUTPATIENT)
Dept: HEPATOLOGY | Facility: CLINIC | Age: 36
End: 2021-04-15
Payer: COMMERCIAL

## 2021-04-15 VITALS — BODY MASS INDEX: 29.29 KG/M2 | WEIGHT: 145 LBS

## 2021-04-15 VITALS
SYSTOLIC BLOOD PRESSURE: 154 MMHG | OXYGEN SATURATION: 96 % | HEART RATE: 106 BPM | HEIGHT: 59 IN | TEMPERATURE: 98 F | DIASTOLIC BLOOD PRESSURE: 80 MMHG

## 2021-04-15 DIAGNOSIS — E28.2 POLYCYSTIC OVARIAN SYNDROME: ICD-10-CM

## 2021-04-15 DIAGNOSIS — E66.9 OBESITY, UNSPECIFIED: ICD-10-CM

## 2021-04-15 DIAGNOSIS — E88.01 ALPHA-1-ANTITRYPSIN DEFICIENCY: ICD-10-CM

## 2021-04-15 DIAGNOSIS — R76.8 OTHER SPECIFIED ABNORMAL IMMUNOLOGICAL FINDINGS IN SERUM: ICD-10-CM

## 2021-04-15 DIAGNOSIS — R74.8 ABNORMAL LEVELS OF OTHER SERUM ENZYMES: ICD-10-CM

## 2021-04-15 PROCEDURE — 99214 OFFICE O/P EST MOD 30 MIN: CPT

## 2021-04-15 PROCEDURE — 99072 ADDL SUPL MATRL&STAF TM PHE: CPT

## 2021-04-15 NOTE — ASSESSMENT
[FreeTextEntry1] : Ms. NOVAK is a 35 year old woman with\par \par - elevated liver enzymes with increase > 200 in December 2020, now much improved.\par - serologic workup notable for elevated  BROOKE and ASMA, normal IgG, A1AT heterozygosity, normal phosphatidyl ethanol level\par - obesity, lost 24 lbs between December and April 2021, taking phendimetrazine 35 mg/d\par - PCOS\par - HLD. Hypertriglyceridemia resolved between 2018 and 3/2021, likely due to weight loss\par - liver not fatty-appearing on US\par - mild epigastric pain and tenderness, does not want to try PPI\par - took nitrofurantoin in 2018 ,which can cause autoimmune hepatitis, birth control Junel until early 2020. No recent medication to explain rise of LFTs. PHendimetrazine was started after peak of LFTs.\par - no FHx of liver disease. No neurologic/psychiatric illness. \par \par Improvement of LFTs and resolution of hypertriglyceridemia may be due to weight loss.\par \par Plan:\par - labs as below today and before next visit in 2 months. She will call in a few days. If LFTs normal, will cancel f/u appointment and she will have her LFTs re-checked by her PCP.\par - no liver biopsy for now

## 2021-04-15 NOTE — HISTORY OF PRESENT ILLNESS
[de-identified] : - 4/15/21: returns after bloodwork and losing another  5 lbs, BMI now 29.3. Doing well. Did not get liver biopsy as office did not call.\par \par - Ms. NOVAK is a 35 year old woman with PCOS, obesity BMI 30, polyarthralgia, who was referred by her rheumatologist, because of elevated AST/ALT that increased to max 251/192 on 1/7/21; improved to 94/68 on 2/3/21; were <75 since 2016. She saw Dr. Caldwell in 2018, when an US was normal, serologies for hepatitis B and C were negative. BROOKE and ASMA elevated, but IgG normal. She saw him again recently. Repeat US Nov again normal. started phendimetrazine in Jan 2021 for weight loss, lost 16 lbs since December.\par She s.t. has pruritus on thighs and lower abdomen, mildly on her knuckles on occasion - when skin dry.\par She took nitrofurantoin in 2016.\par She has epigastric pain and s.t. diarrhea and constipation.\par \par HBsAg, HCV Ab negative 2/25/20, BROOKE 1:320, ASMA 1:40, IgG normal in 2018 and recently. AMA, TTG nl, ceruloplasmin 45 7/2018\par \par Weight hx: 150 lbs, BMI 30 in 3/2021 after 16 lbs weight loss in Dec 2020. Max weight was 173 lbs in Oct 2020, after weight gain from 140s-150s until 2015 (BMI 28-30). Was slim as teenager.\par Alcohol hx: rarely. Never more.\par Meds: VitD. Stopped Junel birth control in early 2020. She took nitrofurantoin in 2018 for UTI. Took Metformin in 2018 for PCOS.\par \par Workup:\par - 2/3/21 TTG negative, IgA normal.\par - 11/12/21 US abdomen (Emma, report seen on pt's phone): normal exam.\par - 4/24/18 US (Emma): normal exam.

## 2021-04-16 LAB
ALBUMIN SERPL ELPH-MCNC: 4.7 G/DL
ALP BLD-CCNC: 118 U/L
ALT SERPL-CCNC: 26 U/L
ANION GAP SERPL CALC-SCNC: 15 MMOL/L
AST SERPL-CCNC: 44 U/L
BILIRUB SERPL-MCNC: 0.4 MG/DL
BUN SERPL-MCNC: 6 MG/DL
CALCIUM SERPL-MCNC: 10 MG/DL
CHLORIDE SERPL-SCNC: 104 MMOL/L
CO2 SERPL-SCNC: 22 MMOL/L
CREAT SERPL-MCNC: 0.65 MG/DL
GLUCOSE SERPL-MCNC: 124 MG/DL
POTASSIUM SERPL-SCNC: 3.8 MMOL/L
PROT SERPL-MCNC: 7.7 G/DL
SODIUM SERPL-SCNC: 141 MMOL/L

## 2021-04-20 ENCOUNTER — APPOINTMENT (OUTPATIENT)
Dept: ORTHOPEDIC SURGERY | Facility: CLINIC | Age: 36
End: 2021-04-20
Payer: COMMERCIAL

## 2021-04-20 PROCEDURE — 99214 OFFICE O/P EST MOD 30 MIN: CPT

## 2021-04-20 PROCEDURE — 99072 ADDL SUPL MATRL&STAF TM PHE: CPT

## 2021-04-20 NOTE — PHYSICAL EXAM
[de-identified] : Patient is alert, oriented and in no acute distress. Affect and general appearance are normal and patient is able to answer questions appropriately. On the left, she has a 1 cm bouncy mass over the SL interval. It is tender to palpation. No pain with finger extension. \par \par Neurologic: Median, ulnar, and radial motor and sensory are intact.\par Skin: No cyanosis, clubbing, edema or rashes.\par Vascular: Radial pulses intact.\par Lymphatic: No streaking or epitrochlear adenopathy.  [de-identified] : 3  Xray views of the left wrist taken in her last visit show no bony trauma, no arthritis, no SL widening and normal carpal alignment.

## 2021-04-20 NOTE — ADDENDUM
[FreeTextEntry1] : I, Jewels Todd acted solely as a scribe for Dr. Hallie Ribeiro on 04/20/2021. \par \par All medical record entries made by the Scribe were at my, Dr. Hallie Ribeiro, direction and personally dictated by me on 04/20/2021. I have personally reviewed the chart and agree that the record accurately reflects my personal performance of the history, physical exam, assessment and plan.

## 2021-04-20 NOTE — ASSESSMENT
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 We talked about the nature of the condition and treatment options. The patient has elected to proceed with a left wrist cyst excision and will be called to schedule the procedure. \par \par The details of surgery, treatment alternatives, and the associated risks, complications, limitations, and expectations have been reviewed and discussed with the patient. All questions have been answered. The patient has expressed acceptance and understanding. Consent will be signed on the day of surgery.\par \par

## 2021-04-20 NOTE — HISTORY OF PRESENT ILLNESS
[FreeTextEntry1] : Patient is a 34yo RHD  furloughed  with left dorsal wrist pain for 8 months. Patient has noticed a dorsal wrist bump which appeared 6 months ago. \par It is currently not painful.  She underwent cortisone injection 6 mo ago with temporary relief, has returned.

## 2021-05-04 ENCOUNTER — OUTPATIENT (OUTPATIENT)
Dept: OUTPATIENT SERVICES | Facility: HOSPITAL | Age: 36
LOS: 1 days | End: 2021-05-04
Payer: COMMERCIAL

## 2021-05-04 VITALS
TEMPERATURE: 99 F | HEIGHT: 59 IN | SYSTOLIC BLOOD PRESSURE: 130 MMHG | WEIGHT: 143.96 LBS | RESPIRATION RATE: 18 BRPM | DIASTOLIC BLOOD PRESSURE: 85 MMHG | HEART RATE: 93 BPM | OXYGEN SATURATION: 98 %

## 2021-05-04 DIAGNOSIS — M67.40 GANGLION, UNSPECIFIED SITE: ICD-10-CM

## 2021-05-04 DIAGNOSIS — Z01.818 ENCOUNTER FOR OTHER PREPROCEDURAL EXAMINATION: ICD-10-CM

## 2021-05-04 DIAGNOSIS — Z98.891 HISTORY OF UTERINE SCAR FROM PREVIOUS SURGERY: Chronic | ICD-10-CM

## 2021-05-04 DIAGNOSIS — Z98.890 OTHER SPECIFIED POSTPROCEDURAL STATES: Chronic | ICD-10-CM

## 2021-05-04 PROCEDURE — 85027 COMPLETE CBC AUTOMATED: CPT

## 2021-05-04 PROCEDURE — G0463: CPT

## 2021-05-04 RX ORDER — LIDOCAINE HCL 20 MG/ML
0.2 VIAL (ML) INJECTION ONCE
Refills: 0 | Status: DISCONTINUED | OUTPATIENT
Start: 2021-05-17 | End: 2021-06-01

## 2021-05-04 RX ORDER — SODIUM CHLORIDE 9 MG/ML
3 INJECTION INTRAMUSCULAR; INTRAVENOUS; SUBCUTANEOUS EVERY 8 HOURS
Refills: 0 | Status: DISCONTINUED | OUTPATIENT
Start: 2021-05-17 | End: 2021-06-01

## 2021-05-04 NOTE — H&P PST ADULT - NSICDXPROBLEM_GEN_ALL_CORE_FT
PROBLEM DIAGNOSES  Problem: Ganglion  Assessment and Plan: coming in for left wrist dorsal subretinacular ganglion excision

## 2021-05-04 NOTE — H&P PST ADULT - HISTORY OF PRESENT ILLNESS
35yr old female with ganglion on left dorsal wrist that was aspirated in office  and shortly came back. Pt now coming in for left wrist dorsal sub retinacular   ganglion excision. Pt has covid vaccine x2    Note; covid test 5/14/21 [facility]

## 2021-05-04 NOTE — H&P PST ADULT - NSANTHOSAYNRD_GEN_A_CORE
No. NAZARIO screening performed.  STOP BANG Legend: 0-2 = LOW Risk; 3-4 = INTERMEDIATE Risk; 5-8 = HIGH Risk

## 2021-05-07 PROBLEM — E88.01 ALPHA-1-ANTITRYPSIN DEFICIENCY: Chronic | Status: ACTIVE | Noted: 2021-05-04

## 2021-05-14 ENCOUNTER — OUTPATIENT (OUTPATIENT)
Dept: OUTPATIENT SERVICES | Facility: HOSPITAL | Age: 36
LOS: 1 days | End: 2021-05-14
Payer: COMMERCIAL

## 2021-05-14 DIAGNOSIS — Z11.52 ENCOUNTER FOR SCREENING FOR COVID-19: ICD-10-CM

## 2021-05-14 DIAGNOSIS — Z98.890 OTHER SPECIFIED POSTPROCEDURAL STATES: Chronic | ICD-10-CM

## 2021-05-14 DIAGNOSIS — Z98.891 HISTORY OF UTERINE SCAR FROM PREVIOUS SURGERY: Chronic | ICD-10-CM

## 2021-05-14 LAB — SARS-COV-2 RNA SPEC QL NAA+PROBE: SIGNIFICANT CHANGE UP

## 2021-05-14 PROCEDURE — C9803: CPT

## 2021-05-14 PROCEDURE — U0003: CPT

## 2021-05-14 PROCEDURE — U0005: CPT

## 2021-05-16 ENCOUNTER — TRANSCRIPTION ENCOUNTER (OUTPATIENT)
Age: 36
End: 2021-05-16

## 2021-05-17 ENCOUNTER — APPOINTMENT (OUTPATIENT)
Dept: ORTHOPEDIC SURGERY | Facility: HOSPITAL | Age: 36
End: 2021-05-17

## 2021-05-17 ENCOUNTER — OUTPATIENT (OUTPATIENT)
Dept: OUTPATIENT SERVICES | Facility: HOSPITAL | Age: 36
LOS: 1 days | End: 2021-05-17
Payer: COMMERCIAL

## 2021-05-17 VITALS
DIASTOLIC BLOOD PRESSURE: 87 MMHG | TEMPERATURE: 98 F | SYSTOLIC BLOOD PRESSURE: 126 MMHG | WEIGHT: 143.96 LBS | HEART RATE: 81 BPM | HEIGHT: 59 IN | RESPIRATION RATE: 17 BRPM | OXYGEN SATURATION: 98 %

## 2021-05-17 VITALS
DIASTOLIC BLOOD PRESSURE: 68 MMHG | TEMPERATURE: 98 F | RESPIRATION RATE: 18 BRPM | OXYGEN SATURATION: 99 % | SYSTOLIC BLOOD PRESSURE: 125 MMHG | HEART RATE: 83 BPM

## 2021-05-17 DIAGNOSIS — Z98.891 HISTORY OF UTERINE SCAR FROM PREVIOUS SURGERY: Chronic | ICD-10-CM

## 2021-05-17 DIAGNOSIS — Z98.890 OTHER SPECIFIED POSTPROCEDURAL STATES: Chronic | ICD-10-CM

## 2021-05-17 DIAGNOSIS — M67.40 GANGLION, UNSPECIFIED SITE: ICD-10-CM

## 2021-05-17 PROCEDURE — 25111 REMOVE WRIST TENDON LESION: CPT | Mod: LT

## 2021-05-17 RX ORDER — OXYCODONE HYDROCHLORIDE 5 MG/1
5 TABLET ORAL ONCE
Refills: 0 | Status: DISCONTINUED | OUTPATIENT
Start: 2021-05-17 | End: 2021-05-17

## 2021-05-17 RX ORDER — CHLORHEXIDINE GLUCONATE 213 G/1000ML
1 SOLUTION TOPICAL ONCE
Refills: 0 | Status: COMPLETED | OUTPATIENT
Start: 2021-05-17 | End: 2021-05-17

## 2021-05-17 RX ORDER — FENTANYL CITRATE 50 UG/ML
25 INJECTION INTRAVENOUS
Refills: 0 | Status: DISCONTINUED | OUTPATIENT
Start: 2021-05-17 | End: 2021-05-17

## 2021-05-17 RX ORDER — PHENDIMETRAZINE TARTRATE 105 MG
1 CAPSULE, EXTENDED RELEASE ORAL
Qty: 0 | Refills: 0 | DISCHARGE

## 2021-05-17 RX ORDER — ONDANSETRON 8 MG/1
4 TABLET, FILM COATED ORAL ONCE
Refills: 0 | Status: DISCONTINUED | OUTPATIENT
Start: 2021-05-17 | End: 2021-06-01

## 2021-05-17 RX ADMIN — SODIUM CHLORIDE 3 MILLILITER(S): 9 INJECTION INTRAMUSCULAR; INTRAVENOUS; SUBCUTANEOUS at 13:00

## 2021-05-17 RX ADMIN — CHLORHEXIDINE GLUCONATE 1 APPLICATION(S): 213 SOLUTION TOPICAL at 13:34

## 2021-05-17 NOTE — ASU DISCHARGE PLAN (ADULT/PEDIATRIC) - CARE PROVIDER_API CALL
Hallie Ribeiro (MD; MPH)  Orthopaedic Surgery  [address], [address]  [place] [address]  Phone: [phone]  Fax: [phone]  Follow Up Time:

## 2021-05-18 ENCOUNTER — RESULT REVIEW (OUTPATIENT)
Age: 36
End: 2021-05-18

## 2021-05-18 PROCEDURE — 88304 TISSUE EXAM BY PATHOLOGIST: CPT | Mod: 26

## 2021-05-18 PROCEDURE — 88304 TISSUE EXAM BY PATHOLOGIST: CPT

## 2021-05-18 PROCEDURE — 25111 REMOVE WRIST TENDON LESION: CPT | Mod: LT

## 2021-05-26 LAB — SURGICAL PATHOLOGY STUDY: SIGNIFICANT CHANGE UP

## 2021-06-01 ENCOUNTER — APPOINTMENT (OUTPATIENT)
Dept: ORTHOPEDIC SURGERY | Facility: CLINIC | Age: 36
End: 2021-06-01
Payer: COMMERCIAL

## 2021-06-01 DIAGNOSIS — M67.40 GANGLION, UNSPECIFIED SITE: ICD-10-CM

## 2021-06-01 PROCEDURE — 99024 POSTOP FOLLOW-UP VISIT: CPT

## 2021-06-01 NOTE — ADDENDUM
[FreeTextEntry1] : I, Jewels Todd acted solely as a scribe for Dr. Hallie Ribeiro on 06/01/2021. \par \par All medical record entries made by the Scribe were at my, Dr. Hallie Ribeiro, direction and personally dictated by me on 06/01/2021. I have personally reviewed the chart and agree that the record accurately reflects my personal performance of the history, physical exam, assessment and plan.

## 2021-06-01 NOTE — HISTORY OF PRESENT ILLNESS
[de-identified] : DOS: 5/21/2021\par Procedure: Left dorsal wrist ganglion excision [de-identified] : 35 year female s/p Left dorsal wrist ganglion excision 5/21/2021. Overall she is doing well. Post-operative pain is well control. Incision is healing well. Denies fever, chills, night sweats.\par  [de-identified] : Incision is healing well overall. No signs of infection. Able to make a tight fist. Minimal swelling.  [de-identified] : Patient is a 35 year old female recovering extremely well after left dorsal wrist ganglion excision on 5/21/2021. The patient was encouraged to massage the incision site 2-3 times per day to encourage blood flow and decrease scar tissue formation. She was advised to keep the scar covered this summer when exposed to sunlight. Follow up as needed.

## 2021-06-15 ENCOUNTER — TRANSCRIPTION ENCOUNTER (OUTPATIENT)
Age: 36
End: 2021-06-15

## 2021-09-24 NOTE — PRE-ANESTHESIA EVALUATION ADULT - NSANTHASARD_GEN_ALL_CORE
Patient is a 53 yo woman referred by Dr. Mandy Lozada for above issues. A copy of this document will be sent to referring provider. She was in her usual state of health until end June 2021 when she presented to ER for acute severe RUQ pain. She had normal LFT's and symptoms were attributed to ? pleurisy. No imaging performed at that time. Three weeks later she had recurrence of pain but more in epigastric area. This time her ALT and AST were elevated 3 x ULN without jaundice. She managed with Amy Selzer with relief. She denied nausea, vomiting and acholuria. She was placed on Omeprazole and Sucralfate with no recurrence since then. RUQ USG showed normal gallbladder and liver. No prior history of liver disease. She has never had an EGD. Her last colonoscopy was 2016 when a small TA removed. No family history of liver disease or CRC. She is dealing with sick family members and feels stressed. 2

## 2022-03-14 ENCOUNTER — TRANSCRIPTION ENCOUNTER (OUTPATIENT)
Age: 37
End: 2022-03-14

## 2022-05-21 ENCOUNTER — NON-APPOINTMENT (OUTPATIENT)
Age: 37
End: 2022-05-21

## 2023-09-30 ENCOUNTER — NON-APPOINTMENT (OUTPATIENT)
Age: 38
End: 2023-09-30

## 2024-03-31 ENCOUNTER — NON-APPOINTMENT (OUTPATIENT)
Age: 39
End: 2024-03-31

## 2025-01-23 ENCOUNTER — NON-APPOINTMENT (OUTPATIENT)
Age: 40
End: 2025-01-23

## 2025-01-27 ENCOUNTER — APPOINTMENT (OUTPATIENT)
Dept: OBGYN | Facility: CLINIC | Age: 40
End: 2025-01-27
Payer: COMMERCIAL

## 2025-01-27 VITALS
BODY MASS INDEX: 30.04 KG/M2 | WEIGHT: 149 LBS | DIASTOLIC BLOOD PRESSURE: 86 MMHG | HEIGHT: 59 IN | SYSTOLIC BLOOD PRESSURE: 122 MMHG

## 2025-01-27 DIAGNOSIS — Z87.39 PERSONAL HISTORY OF OTHER DISEASES OF THE MUSCULOSKELETAL SYSTEM AND CONNECTIVE TISSUE: ICD-10-CM

## 2025-01-27 DIAGNOSIS — Z30.432 ENCOUNTER FOR REMOVAL OF INTRAUTERINE CONTRACEPTIVE DEVICE: ICD-10-CM

## 2025-01-27 DIAGNOSIS — Z30.430 ENCOUNTER FOR INSERTION OF INTRAUTERINE CONTRACEPTIVE DEVICE: ICD-10-CM

## 2025-01-27 DIAGNOSIS — Z87.42 PERSONAL HISTORY OF OTHER DISEASES OF THE FEMALE GENITAL TRACT: ICD-10-CM

## 2025-01-27 DIAGNOSIS — R74.8 ABNORMAL LEVELS OF OTHER SERUM ENZYMES: ICD-10-CM

## 2025-01-27 DIAGNOSIS — Z11.3 ENCOUNTER FOR SCREENING FOR INFECTIONS WITH A PREDOMINANTLY SEXUAL MODE OF TRANSMISSION: ICD-10-CM

## 2025-01-27 DIAGNOSIS — Z36.89 ENCOUNTER FOR OTHER SPECIFIED ANTENATAL SCREENING: ICD-10-CM

## 2025-01-27 DIAGNOSIS — L65.9 NONSCARRING HAIR LOSS, UNSPECIFIED: ICD-10-CM

## 2025-01-27 DIAGNOSIS — M67.40 GANGLION, UNSPECIFIED SITE: ICD-10-CM

## 2025-01-27 DIAGNOSIS — Z30.9 ENCOUNTER FOR CONTRACEPTIVE MANAGEMENT, UNSPECIFIED: ICD-10-CM

## 2025-01-27 DIAGNOSIS — R76.8 OTHER SPECIFIED ABNORMAL IMMUNOLOGICAL FINDINGS IN SERUM: ICD-10-CM

## 2025-01-27 DIAGNOSIS — Z98.891 HISTORY OF UTERINE SCAR FROM PREVIOUS SURGERY: ICD-10-CM

## 2025-01-27 PROCEDURE — 0501F PRENATAL FLOW SHEET: CPT

## 2025-01-27 PROCEDURE — 81003 URINALYSIS AUTO W/O SCOPE: CPT | Mod: QW

## 2025-01-27 PROCEDURE — 81025 URINE PREGNANCY TEST: CPT

## 2025-01-27 PROCEDURE — 76817 TRANSVAGINAL US OBSTETRIC: CPT

## 2025-01-27 RX ORDER — ASCORBIC ACID, CHOLECALCIFEROL, .ALPHA.-TOCOPHEROL ACETATE, DL-, PYRIDOXINE, FOLIC ACID, CYANOCOBALAMIN, CALCIUM, FERROUS FUMARATE, MAGNESIUM, DOCONEXENT 85; 200; 10; 25; 1; 12; 140; 27; 45; 300 [IU]/1; [IU]/1; [IU]/1; [IU]/1; MG/1; UG/1; MG/1; MG/1; MG/1; MG/1
27-0.6-0.4-3 CAPSULE, GELATIN COATED ORAL
Qty: 90 | Refills: 3 | Status: ACTIVE | COMMUNITY
Start: 2025-01-27 | End: 1900-01-01

## 2025-01-27 RX ORDER — ASPIRIN ENTERIC COATED TABLETS 81 MG 81 MG/1
81 TABLET, DELAYED RELEASE ORAL DAILY
Qty: 90 | Refills: 3 | Status: ACTIVE | COMMUNITY
Start: 2025-01-27 | End: 1900-01-01

## 2025-01-28 LAB
BILIRUB UR QL STRIP: NORMAL
GLUCOSE UR-MCNC: NORMAL
HCG UR QL: 0.2 EU/DL
HCG UR QL: POSITIVE
HGB UR QL STRIP.AUTO: NORMAL
KETONES UR-MCNC: NORMAL
LEUKOCYTE ESTERASE UR QL STRIP: NORMAL
NITRITE UR QL STRIP: NORMAL
PH UR STRIP: 5.5
PROT UR STRIP-MCNC: 30
QUALITY CONTROL: YES
SP GR UR STRIP: 1.03

## 2025-01-29 ENCOUNTER — EMERGENCY (EMERGENCY)
Facility: HOSPITAL | Age: 40
LOS: 1 days | Discharge: HOME | End: 2025-01-29
Admitting: STUDENT IN AN ORGANIZED HEALTH CARE EDUCATION/TRAINING PROGRAM
Payer: COMMERCIAL

## 2025-01-29 ENCOUNTER — NON-APPOINTMENT (OUTPATIENT)
Age: 40
End: 2025-01-29

## 2025-01-29 VITALS
RESPIRATION RATE: 19 BRPM | OXYGEN SATURATION: 99 % | SYSTOLIC BLOOD PRESSURE: 140 MMHG | DIASTOLIC BLOOD PRESSURE: 80 MMHG | TEMPERATURE: 98 F | HEART RATE: 85 BPM

## 2025-01-29 VITALS
SYSTOLIC BLOOD PRESSURE: 144 MMHG | HEART RATE: 77 BPM | RESPIRATION RATE: 17 BRPM | WEIGHT: 149.91 LBS | OXYGEN SATURATION: 99 % | TEMPERATURE: 99 F | DIASTOLIC BLOOD PRESSURE: 78 MMHG

## 2025-01-29 DIAGNOSIS — Z98.891 HISTORY OF UTERINE SCAR FROM PREVIOUS SURGERY: Chronic | ICD-10-CM

## 2025-01-29 DIAGNOSIS — Z98.890 OTHER SPECIFIED POSTPROCEDURAL STATES: Chronic | ICD-10-CM

## 2025-01-29 LAB
ADD ON TEST-SPECIMEN IN LAB: SIGNIFICANT CHANGE UP
ALBUMIN SERPL ELPH-MCNC: 4.4 G/DL — SIGNIFICANT CHANGE UP (ref 3.3–5)
ALP SERPL-CCNC: 85 U/L — SIGNIFICANT CHANGE UP (ref 40–120)
ALT FLD-CCNC: 14 U/L — SIGNIFICANT CHANGE UP (ref 4–33)
ANION GAP SERPL CALC-SCNC: 12 MMOL/L — SIGNIFICANT CHANGE UP (ref 7–14)
APPEARANCE UR: CLEAR — SIGNIFICANT CHANGE UP
APTT BLD: 30.5 SEC — SIGNIFICANT CHANGE UP (ref 24.5–35.6)
AST SERPL-CCNC: 17 U/L — SIGNIFICANT CHANGE UP (ref 4–32)
BACTERIA UR CULT: NORMAL
BASOPHILS # BLD AUTO: 0.05 K/UL — SIGNIFICANT CHANGE UP (ref 0–0.2)
BASOPHILS NFR BLD AUTO: 0.5 % — SIGNIFICANT CHANGE UP (ref 0–2)
BILIRUB SERPL-MCNC: <0.2 MG/DL — SIGNIFICANT CHANGE UP (ref 0.2–1.2)
BILIRUB UR-MCNC: NEGATIVE — SIGNIFICANT CHANGE UP
BLD GP AB SCN SERPL QL: NEGATIVE — SIGNIFICANT CHANGE UP
BUN SERPL-MCNC: 13 MG/DL — SIGNIFICANT CHANGE UP (ref 7–23)
CALCIUM SERPL-MCNC: 10 MG/DL — SIGNIFICANT CHANGE UP (ref 8.4–10.5)
CHLORIDE SERPL-SCNC: 104 MMOL/L — SIGNIFICANT CHANGE UP (ref 98–107)
CO2 SERPL-SCNC: 24 MMOL/L — SIGNIFICANT CHANGE UP (ref 22–31)
COLOR SPEC: YELLOW — SIGNIFICANT CHANGE UP
CREAT SERPL-MCNC: 0.72 MG/DL — SIGNIFICANT CHANGE UP (ref 0.5–1.3)
DIFF PNL FLD: ABNORMAL
EGFR: 108 ML/MIN/1.73M2 — SIGNIFICANT CHANGE UP
EGFR: 108 ML/MIN/1.73M2 — SIGNIFICANT CHANGE UP
EOSINOPHIL # BLD AUTO: 0.22 K/UL — SIGNIFICANT CHANGE UP (ref 0–0.5)
EOSINOPHIL NFR BLD AUTO: 2.3 % — SIGNIFICANT CHANGE UP (ref 0–6)
GLUCOSE SERPL-MCNC: 92 MG/DL — SIGNIFICANT CHANGE UP (ref 70–99)
GLUCOSE UR QL: NEGATIVE MG/DL — SIGNIFICANT CHANGE UP
HCT VFR BLD CALC: 39 % — SIGNIFICANT CHANGE UP (ref 34.5–45)
HGB BLD-MCNC: 12.8 G/DL — SIGNIFICANT CHANGE UP (ref 11.5–15.5)
IANC: 5.75 K/UL — SIGNIFICANT CHANGE UP (ref 1.8–7.4)
IMM GRANULOCYTES NFR BLD AUTO: 0.7 % — SIGNIFICANT CHANGE UP (ref 0–0.9)
INR BLD: 0.92 RATIO — SIGNIFICANT CHANGE UP (ref 0.85–1.16)
KETONES UR-MCNC: NEGATIVE MG/DL — SIGNIFICANT CHANGE UP
LEUKOCYTE ESTERASE UR-ACNC: NEGATIVE — SIGNIFICANT CHANGE UP
LYMPHOCYTES # BLD AUTO: 3.01 K/UL — SIGNIFICANT CHANGE UP (ref 1–3.3)
LYMPHOCYTES # BLD AUTO: 31.3 % — SIGNIFICANT CHANGE UP (ref 13–44)
MCHC RBC-ENTMCNC: 28.4 PG — SIGNIFICANT CHANGE UP (ref 27–34)
MCHC RBC-ENTMCNC: 32.8 G/DL — SIGNIFICANT CHANGE UP (ref 32–36)
MCV RBC AUTO: 86.5 FL — SIGNIFICANT CHANGE UP (ref 80–100)
MONOCYTES # BLD AUTO: 0.52 K/UL — SIGNIFICANT CHANGE UP (ref 0–0.9)
MONOCYTES NFR BLD AUTO: 5.4 % — SIGNIFICANT CHANGE UP (ref 2–14)
NEUTROPHILS # BLD AUTO: 5.75 K/UL — SIGNIFICANT CHANGE UP (ref 1.8–7.4)
NEUTROPHILS NFR BLD AUTO: 59.8 % — SIGNIFICANT CHANGE UP (ref 43–77)
NITRITE UR-MCNC: NEGATIVE — SIGNIFICANT CHANGE UP
NRBC # BLD AUTO: 0 K/UL — SIGNIFICANT CHANGE UP (ref 0–0)
NRBC # BLD: 0 /100 WBCS — SIGNIFICANT CHANGE UP (ref 0–0)
NRBC # FLD: 0 K/UL — SIGNIFICANT CHANGE UP (ref 0–0)
NRBC BLD-RTO: 0 /100 WBCS — SIGNIFICANT CHANGE UP (ref 0–0)
PH UR: 7.5 — SIGNIFICANT CHANGE UP (ref 5–8)
PLATELET # BLD AUTO: 225 K/UL — SIGNIFICANT CHANGE UP (ref 150–400)
POTASSIUM SERPL-MCNC: 4.1 MMOL/L — SIGNIFICANT CHANGE UP (ref 3.5–5.3)
POTASSIUM SERPL-SCNC: 4.1 MMOL/L — SIGNIFICANT CHANGE UP (ref 3.5–5.3)
PROT SERPL-MCNC: 7.9 G/DL — SIGNIFICANT CHANGE UP (ref 6–8.3)
PROT UR-MCNC: NEGATIVE MG/DL — SIGNIFICANT CHANGE UP
PROTHROM AB SERPL-ACNC: 10.7 SEC — SIGNIFICANT CHANGE UP (ref 9.9–13.4)
RBC # BLD: 4.51 M/UL — SIGNIFICANT CHANGE UP (ref 3.8–5.2)
RBC # FLD: 12.6 % — SIGNIFICANT CHANGE UP (ref 10.3–14.5)
RH IG SCN BLD-IMP: POSITIVE — SIGNIFICANT CHANGE UP
SODIUM SERPL-SCNC: 140 MMOL/L — SIGNIFICANT CHANGE UP (ref 135–145)
SP GR SPEC: 1.01 — SIGNIFICANT CHANGE UP (ref 1–1.03)
UROBILINOGEN FLD QL: 0.2 MG/DL — SIGNIFICANT CHANGE UP (ref 0.2–1)
WBC # BLD: 9.62 K/UL — SIGNIFICANT CHANGE UP (ref 3.8–10.5)
WBC # FLD AUTO: 9.62 K/UL — SIGNIFICANT CHANGE UP (ref 3.8–10.5)

## 2025-01-29 PROCEDURE — 99284 EMERGENCY DEPT VISIT MOD MDM: CPT

## 2025-01-29 PROCEDURE — 76817 TRANSVAGINAL US OBSTETRIC: CPT | Mod: 26

## 2025-01-30 LAB
CULTURE RESULTS: SIGNIFICANT CHANGE UP
SPECIMEN SOURCE: SIGNIFICANT CHANGE UP

## 2025-01-31 ENCOUNTER — APPOINTMENT (OUTPATIENT)
Dept: OBGYN | Facility: CLINIC | Age: 40
End: 2025-01-31
Payer: COMMERCIAL

## 2025-01-31 VITALS
SYSTOLIC BLOOD PRESSURE: 130 MMHG | DIASTOLIC BLOOD PRESSURE: 72 MMHG | WEIGHT: 154 LBS | BODY MASS INDEX: 31.04 KG/M2 | HEIGHT: 59 IN

## 2025-01-31 DIAGNOSIS — O20.0 THREATENED ABORTION: ICD-10-CM

## 2025-01-31 DIAGNOSIS — Z3A.08 8 WEEKS GESTATION OF PREGNANCY: ICD-10-CM

## 2025-01-31 PROBLEM — O10.919 CHRONIC HYPERTENSION AFFECTING PREGNANCY: Status: ACTIVE | Noted: 2025-01-27

## 2025-01-31 PROCEDURE — 0502F SUBSEQUENT PRENATAL CARE: CPT

## 2025-01-31 PROCEDURE — 76815 OB US LIMITED FETUS(S): CPT

## 2025-02-02 ENCOUNTER — EMERGENCY (EMERGENCY)
Facility: HOSPITAL | Age: 40
LOS: 1 days | Discharge: HOME | End: 2025-02-02
Attending: EMERGENCY MEDICINE | Admitting: EMERGENCY MEDICINE
Payer: COMMERCIAL

## 2025-02-02 VITALS
HEART RATE: 87 BPM | TEMPERATURE: 99 F | DIASTOLIC BLOOD PRESSURE: 88 MMHG | RESPIRATION RATE: 18 BRPM | OXYGEN SATURATION: 100 % | SYSTOLIC BLOOD PRESSURE: 144 MMHG

## 2025-02-02 VITALS
DIASTOLIC BLOOD PRESSURE: 85 MMHG | RESPIRATION RATE: 18 BRPM | WEIGHT: 149.91 LBS | TEMPERATURE: 99 F | OXYGEN SATURATION: 98 % | HEIGHT: 59 IN | HEART RATE: 85 BPM | SYSTOLIC BLOOD PRESSURE: 135 MMHG

## 2025-02-02 PROBLEM — Z78.9 OTHER SPECIFIED HEALTH STATUS: Chronic | Status: ACTIVE | Noted: 2025-01-29

## 2025-02-02 LAB
ALBUMIN SERPL ELPH-MCNC: 4.4 G/DL — SIGNIFICANT CHANGE UP (ref 3.3–5)
ALP SERPL-CCNC: 83 U/L — SIGNIFICANT CHANGE UP (ref 40–120)
ALT FLD-CCNC: 13 U/L — SIGNIFICANT CHANGE UP (ref 4–33)
ANION GAP SERPL CALC-SCNC: 13 MMOL/L — SIGNIFICANT CHANGE UP (ref 7–14)
APPEARANCE UR: CLEAR — SIGNIFICANT CHANGE UP
AST SERPL-CCNC: 24 U/L — SIGNIFICANT CHANGE UP (ref 4–32)
BACTERIA # UR AUTO: NEGATIVE /HPF — SIGNIFICANT CHANGE UP
BASOPHILS # BLD AUTO: 0.04 K/UL — SIGNIFICANT CHANGE UP (ref 0–0.2)
BASOPHILS NFR BLD AUTO: 0.4 % — SIGNIFICANT CHANGE UP (ref 0–2)
BILIRUB SERPL-MCNC: 0.4 MG/DL — SIGNIFICANT CHANGE UP (ref 0.2–1.2)
BILIRUB UR QL STRIP: NORMAL
BILIRUB UR-MCNC: NEGATIVE — SIGNIFICANT CHANGE UP
BLD GP AB SCN SERPL QL: NEGATIVE — SIGNIFICANT CHANGE UP
BUN SERPL-MCNC: 10 MG/DL — SIGNIFICANT CHANGE UP (ref 7–23)
CALCIUM SERPL-MCNC: 9.5 MG/DL — SIGNIFICANT CHANGE UP (ref 8.4–10.5)
CAST: 0 /LPF — SIGNIFICANT CHANGE UP (ref 0–4)
CHLORIDE SERPL-SCNC: 105 MMOL/L — SIGNIFICANT CHANGE UP (ref 98–107)
CO2 SERPL-SCNC: 22 MMOL/L — SIGNIFICANT CHANGE UP (ref 22–31)
COLOR SPEC: YELLOW — SIGNIFICANT CHANGE UP
CREAT SERPL-MCNC: 0.76 MG/DL — SIGNIFICANT CHANGE UP (ref 0.5–1.3)
DIFF PNL FLD: ABNORMAL
EGFR: 102 ML/MIN/1.73M2 — SIGNIFICANT CHANGE UP
EOSINOPHIL # BLD AUTO: 0.06 K/UL — SIGNIFICANT CHANGE UP (ref 0–0.5)
EOSINOPHIL NFR BLD AUTO: 0.7 % — SIGNIFICANT CHANGE UP (ref 0–6)
GLUCOSE SERPL-MCNC: 83 MG/DL — SIGNIFICANT CHANGE UP (ref 70–99)
GLUCOSE UR QL: NEGATIVE MG/DL — SIGNIFICANT CHANGE UP
GLUCOSE UR-MCNC: NORMAL
HCG SERPL-ACNC: 4938 MIU/ML — SIGNIFICANT CHANGE UP
HCG UR QL: 0.2 EU/DL
HCT VFR BLD CALC: 39.8 % — SIGNIFICANT CHANGE UP (ref 34.5–45)
HGB BLD-MCNC: 13.5 G/DL — SIGNIFICANT CHANGE UP (ref 11.5–15.5)
HGB UR QL STRIP.AUTO: NORMAL
IANC: 6.27 K/UL — SIGNIFICANT CHANGE UP (ref 1.8–7.4)
IMM GRANULOCYTES NFR BLD AUTO: 0.4 % — SIGNIFICANT CHANGE UP (ref 0–0.9)
KETONES UR-MCNC: ABNORMAL MG/DL
KETONES UR-MCNC: NORMAL
LEUKOCYTE ESTERASE UR QL STRIP: NORMAL
LEUKOCYTE ESTERASE UR-ACNC: NEGATIVE — SIGNIFICANT CHANGE UP
LYMPHOCYTES # BLD AUTO: 1.96 K/UL — SIGNIFICANT CHANGE UP (ref 1–3.3)
LYMPHOCYTES # BLD AUTO: 21.9 % — SIGNIFICANT CHANGE UP (ref 13–44)
MCHC RBC-ENTMCNC: 28.7 PG — SIGNIFICANT CHANGE UP (ref 27–34)
MCHC RBC-ENTMCNC: 33.9 G/DL — SIGNIFICANT CHANGE UP (ref 32–36)
MCV RBC AUTO: 84.7 FL — SIGNIFICANT CHANGE UP (ref 80–100)
MONOCYTES # BLD AUTO: 0.57 K/UL — SIGNIFICANT CHANGE UP (ref 0–0.9)
MONOCYTES NFR BLD AUTO: 6.4 % — SIGNIFICANT CHANGE UP (ref 2–14)
NEUTROPHILS # BLD AUTO: 6.27 K/UL — SIGNIFICANT CHANGE UP (ref 1.8–7.4)
NEUTROPHILS NFR BLD AUTO: 70.2 % — SIGNIFICANT CHANGE UP (ref 43–77)
NITRITE UR QL STRIP: NORMAL
NITRITE UR-MCNC: NEGATIVE — SIGNIFICANT CHANGE UP
NRBC # BLD AUTO: 0 K/UL — SIGNIFICANT CHANGE UP (ref 0–0)
NRBC # BLD: 0 /100 WBCS — SIGNIFICANT CHANGE UP (ref 0–0)
NRBC # FLD: 0 K/UL — SIGNIFICANT CHANGE UP (ref 0–0)
NRBC BLD-RTO: 0 /100 WBCS — SIGNIFICANT CHANGE UP (ref 0–0)
PH UR STRIP: 7
PH UR: 6.5 — SIGNIFICANT CHANGE UP (ref 5–8)
PLATELET # BLD AUTO: 239 K/UL — SIGNIFICANT CHANGE UP (ref 150–400)
POTASSIUM SERPL-MCNC: 4.5 MMOL/L — SIGNIFICANT CHANGE UP (ref 3.5–5.3)
POTASSIUM SERPL-SCNC: 4.5 MMOL/L — SIGNIFICANT CHANGE UP (ref 3.5–5.3)
PROT SERPL-MCNC: 7.3 G/DL — SIGNIFICANT CHANGE UP (ref 6–8.3)
PROT UR STRIP-MCNC: 30
PROT UR-MCNC: 30 MG/DL
RBC # BLD: 4.7 M/UL — SIGNIFICANT CHANGE UP (ref 3.8–5.2)
RBC # FLD: 12.2 % — SIGNIFICANT CHANGE UP (ref 10.3–14.5)
RBC CASTS # UR COMP ASSIST: 56 /HPF — HIGH (ref 0–4)
RH IG SCN BLD-IMP: POSITIVE — SIGNIFICANT CHANGE UP
SODIUM SERPL-SCNC: 140 MMOL/L — SIGNIFICANT CHANGE UP (ref 135–145)
SP GR SPEC: 1.02 — SIGNIFICANT CHANGE UP (ref 1–1.03)
SP GR UR STRIP: 1.02
SQUAMOUS # UR AUTO: 2 /HPF — SIGNIFICANT CHANGE UP (ref 0–5)
UROBILINOGEN FLD QL: 1 MG/DL — SIGNIFICANT CHANGE UP (ref 0.2–1)
WBC # BLD: 8.94 K/UL — SIGNIFICANT CHANGE UP (ref 3.8–10.5)
WBC # FLD AUTO: 8.94 K/UL — SIGNIFICANT CHANGE UP (ref 3.8–10.5)
WBC UR QL: 2 /HPF — SIGNIFICANT CHANGE UP (ref 0–5)

## 2025-02-02 RX ORDER — ACETAMINOPHEN 160 MG/5ML
1000 SUSPENSION ORAL ONCE
Refills: 0 | Status: COMPLETED | OUTPATIENT
Start: 2025-02-02 | End: 2025-02-02

## 2025-02-02 RX ORDER — BACTERIOSTATIC SODIUM CHLORIDE 0.9 %
1000 VIAL (ML) INJECTION ONCE
Refills: 0 | Status: COMPLETED | OUTPATIENT
Start: 2025-02-02 | End: 2025-02-02

## 2025-02-02 RX ADMIN — Medication 1000 MILLILITER(S): at 11:48

## 2025-02-02 RX ADMIN — ACETAMINOPHEN 400 MILLIGRAM(S): 160 SUSPENSION ORAL at 11:48

## 2025-02-02 RX ADMIN — ACETAMINOPHEN 1000 MILLIGRAM(S): 160 SUSPENSION ORAL at 12:03

## 2025-02-02 RX ADMIN — ACETAMINOPHEN 1000 MILLIGRAM(S): 160 SUSPENSION ORAL at 12:18

## 2025-02-02 NOTE — ED PROVIDER NOTE - OBJECTIVE STATEMENT
39-year-old female  (2 live births, 1 ) currently approximately 7 weeks pregnant by dates presents to emergency department for evaluation of continued vaginal spotting x 5 days.  Patient was evaluated in the emergency department 3 days ago, had an ultrasound which confirmed an IUP and was without any abnormalities.  She then followed up with her OB/GYN 2 days after the ED visit, and was told that everything was normal with the pregnancy.  She presents today as she had continued spotting and lower abdominal cramping.  Denies fevers, chills, dysuria, nausea, vomiting, difficulty tolerating p.o., headaches, chest pain or shortness of breath.

## 2025-02-02 NOTE — ED ADULT NURSE NOTE - OBJECTIVE STATEMENT
Received patient in Intake 4 c/o vaginal spotting, lower abdominal cramping x 5 days. HX 7 weeks pregnant. Patient denies SOB, chest pain, fever. Patient is A&Ox4, ambulatory, airway patent, breathing unlabored and even, radial pulses palpable. Labs obtained, 20G IV placed on left arm, medications given as ordered, IV fluid bolus infusing. Side rails up and safety maintained. Call bells within reach. Family at the bedside.

## 2025-02-02 NOTE — ED PROVIDER NOTE - CHPI ED SYMPTOMS POS
Benefits, risks, and possible complications of procedure explained to patient/caregiver who verbalized understanding and gave verbal consent.
VAGINAL BLEEDING/CRAMPS

## 2025-02-02 NOTE — ED ADULT TRIAGE NOTE - MEANS OF ARRIVAL
Where Is Your Hair Loss Located?: Scalp List Any Topical Steroids You Are Currently Using (Separate Each Name With A Comma):: Fluocinonide 0.05% solution ambulatory

## 2025-02-02 NOTE — ED PROVIDER NOTE - CLINICAL SUMMARY MEDICAL DECISION MAKING FREE TEXT BOX
39-year-old female  (2 live births, 1 ) currently approximately 7 weeks pregnant by dates presents to emergency department for evaluation of continued vaginal spotting x 5 days. Patient is well appearing, hemodynamically stable. With mild lower pelvic tenderness on exam. With normal TVUS 3 days ago. Will rpt US today, to look for subchorionic hematoma/other cause for bleeding. Will check labs, including type and screen. If US unremarkable, will d/c with existing GYN f/u.

## 2025-02-02 NOTE — ED PROVIDER NOTE - NSFOLLOWUPINSTRUCTIONS_ED_ALL_ED_FT
Today you were seen in the ED for evaluation of your bleeding.    A copy of your results are attached in this document below. The ultrasound showed the fetus still has a heart rate, but it is slower than on the last ultrasound. This may be an early sign of miscarriage, however we need you to reach out to your OBGYN for repeat ultrasound and hormone level checks.     A copy of your results are below. Please bring them to any follow up appointments you attend.     SEEK IMMEDIATE MEDICAL CARE IF YOU HAVE ANY OF THE FOLLOWING SYMPTOMS: severe chest pain, difficulty breathing, fainting, fevers that persist despite taking medications over the counter, vomiting blood, dark or bloody stools, inability to tolerate eating and drinking food by mouth.

## 2025-02-02 NOTE — ED PROVIDER NOTE - ATTENDING CONTRIBUTION TO CARE
Pt was seen and evaluated by me. Pt is a 38 y/o female  at 7 wks gestation who presented to the ED for vaginal bleeding X 5 days. Pt states over the past 5 days having vaginal bleeding with lower abd cramping. Pt was seen 3 days ago with similar and had an US showing an IUP. Pt followed up with their OB/Gyn but notes still having bleeding. Pt denies any fever, chills, nausea, vomiting, SOB, chest pain, or dysuria. Pt has not had bleeding like this with previous pregnancies.   VITALS: Vitals have been reviewed.  GEN APPEARANCE: Alert and cooperative, non-toxic appearing and in NAD  HEAD: Atraumatic, normocephalic.   EYES: PERRL.   EARS: Gross hearing intact.   NOSE: No nasal discharge.   THROAT: MMM. Oral cavity and pharynx normal.   CV: RRR, S1S2, no c/r/m/g. No cyanosis or pallor.   LUNGS: CTAB. No wheezing. No rales. No rhonchi. No diminished breath sounds.   ABDOMEN: Soft, NTND. No guarding or rebound.   MSK/EXT: Spine appears normal, no spine point tenderness.   NEURO: Alert, follows commands. Speech normal. Sensation and motor normal x4 extremities.   SKIN: Normal color for race, warm, dry and intact. No evidence of rash.  38 y/o female  at 7 wks gestation who presented to the ED for vaginal bleeding X 5 days.  Concern for Threatened Ab, Pregnancy Complication  Will get labs, UA, and US. Will give IVF and analgesia.

## 2025-02-02 NOTE — ED PROVIDER NOTE - PHYSICAL EXAMINATION
Gen: well appearing, in no acute distress   Head: normal appearing  HEENT: normal conjunctiva, oral mucosa moist, vision grossly intact   Lung: no respiratory distress, speaking in full sentences, CTA b/l, no wheeze, crackles or rhonchi   CV: regular rate and rhythm, no murmurs  Abd: soft, non distended, mild suprapubic, L and R pelvic tenderness   MSK: no visible deformities, ambulating without difficulty   Neuro: No focal deficits, AAOx3  Skin: Warm, no rashes   Psych: normal affect

## 2025-02-02 NOTE — ED PROVIDER NOTE - PATIENT PORTAL LINK FT
You can access the FollowMyHealth Patient Portal offered by [facility] by registering at the following website: [email or web address]. By joining [facility]’s FollowMyHealth portal, you will also be able to view your health information using other applications (apps) compatible with our system.

## 2025-02-03 ENCOUNTER — NON-APPOINTMENT (OUTPATIENT)
Age: 40
End: 2025-02-03

## 2025-02-07 ENCOUNTER — APPOINTMENT (OUTPATIENT)
Dept: OBGYN | Facility: CLINIC | Age: 40
End: 2025-02-07
Payer: COMMERCIAL

## 2025-02-07 ENCOUNTER — NON-APPOINTMENT (OUTPATIENT)
Age: 40
End: 2025-02-07

## 2025-02-07 VITALS
SYSTOLIC BLOOD PRESSURE: 120 MMHG | BODY MASS INDEX: 30.84 KG/M2 | HEIGHT: 59 IN | DIASTOLIC BLOOD PRESSURE: 70 MMHG | WEIGHT: 153 LBS

## 2025-02-07 DIAGNOSIS — O03.9 COMPLETE OR UNSPECIFIED SPONTANEOUS ABORTION W/OUT COMPLICATION: ICD-10-CM

## 2025-02-07 DIAGNOSIS — O10.919 UNSPECIFIED PRE-EXISTING HYPERTENSION COMPLICATING PREGNANCY, UNSPECIFIED TRIMESTER: ICD-10-CM

## 2025-02-07 LAB
BILIRUB UR QL STRIP: NORMAL
GLUCOSE UR-MCNC: NORMAL
HCG UR QL: 0.2 EU/DL
HGB UR QL STRIP.AUTO: NORMAL
KETONES UR-MCNC: NORMAL
LEUKOCYTE ESTERASE UR QL STRIP: NORMAL
NITRITE UR QL STRIP: NORMAL
PH UR STRIP: 7
PROT UR STRIP-MCNC: 30
SP GR UR STRIP: 1.02

## 2025-02-07 PROCEDURE — 76817 TRANSVAGINAL US OBSTETRIC: CPT

## 2025-02-07 PROCEDURE — 99213 OFFICE O/P EST LOW 20 MIN: CPT | Mod: 25

## 2025-02-08 LAB — HCG SERPL-MCNC: 152 MIU/ML

## 2025-02-10 ENCOUNTER — NON-APPOINTMENT (OUTPATIENT)
Age: 40
End: 2025-02-10

## 2025-02-24 ENCOUNTER — APPOINTMENT (OUTPATIENT)
Dept: OBGYN | Facility: CLINIC | Age: 40
End: 2025-02-24
Payer: COMMERCIAL

## 2025-02-24 VITALS
BODY MASS INDEX: 30.84 KG/M2 | SYSTOLIC BLOOD PRESSURE: 90 MMHG | DIASTOLIC BLOOD PRESSURE: 60 MMHG | WEIGHT: 153 LBS | HEIGHT: 59 IN

## 2025-02-24 DIAGNOSIS — Z3A.08 8 WEEKS GESTATION OF PREGNANCY: ICD-10-CM

## 2025-02-24 DIAGNOSIS — O03.9 COMPLETE OR UNSPECIFIED SPONTANEOUS ABORTION W/OUT COMPLICATION: ICD-10-CM

## 2025-02-24 DIAGNOSIS — Z87.59 PERSONAL HISTORY OF OTHER COMPLICATIONS OF PREGNANCY, CHILDBIRTH AND THE PUERPERIUM: ICD-10-CM

## 2025-02-24 DIAGNOSIS — Z30.09 ENCOUNTER FOR OTHER GENERAL COUNSELING AND ADVICE ON CONTRACEPTION: ICD-10-CM

## 2025-02-24 DIAGNOSIS — Z36.89 ENCOUNTER FOR OTHER SPECIFIED ANTENATAL SCREENING: ICD-10-CM

## 2025-02-24 DIAGNOSIS — O10.919 UNSPECIFIED PRE-EXISTING HYPERTENSION COMPLICATING PREGNANCY, UNSPECIFIED TRIMESTER: ICD-10-CM

## 2025-02-24 PROBLEM — Z78.9 OTHER SPECIFIED HEALTH STATUS: Chronic | Status: ACTIVE | Noted: 2025-01-29

## 2025-02-24 PROCEDURE — 99213 OFFICE O/P EST LOW 20 MIN: CPT | Mod: 25

## 2025-02-25 LAB — HCG SERPL-MCNC: <1 MIU/ML

## 2025-03-24 ENCOUNTER — APPOINTMENT (OUTPATIENT)
Dept: OBGYN | Facility: CLINIC | Age: 40
End: 2025-03-24
Payer: COMMERCIAL

## 2025-03-24 VITALS
HEIGHT: 59 IN | WEIGHT: 155 LBS | DIASTOLIC BLOOD PRESSURE: 80 MMHG | BODY MASS INDEX: 31.25 KG/M2 | SYSTOLIC BLOOD PRESSURE: 126 MMHG

## 2025-03-24 DIAGNOSIS — Z30.09 ENCOUNTER FOR OTHER GENERAL COUNSELING AND ADVICE ON CONTRACEPTION: ICD-10-CM

## 2025-03-24 DIAGNOSIS — E28.2 POLYCYSTIC OVARIAN SYNDROME: ICD-10-CM

## 2025-03-24 DIAGNOSIS — N92.6 IRREGULAR MENSTRUATION, UNSPECIFIED: ICD-10-CM

## 2025-03-24 PROCEDURE — 99213 OFFICE O/P EST LOW 20 MIN: CPT | Mod: 25

## 2025-03-25 LAB
BASOPHILS # BLD AUTO: 0.04 K/UL
BASOPHILS NFR BLD AUTO: 0.5 %
EOSINOPHIL # BLD AUTO: 0.16 K/UL
EOSINOPHIL NFR BLD AUTO: 2 %
ESTIMATED AVERAGE GLUCOSE: 105 MG/DL
HBA1C MFR BLD HPLC: 5.3 %
HCT VFR BLD CALC: 42.8 %
HGB BLD-MCNC: 13.6 G/DL
IMM GRANULOCYTES NFR BLD AUTO: 0.4 %
LYMPHOCYTES # BLD AUTO: 2.34 K/UL
LYMPHOCYTES NFR BLD AUTO: 29.3 %
MAN DIFF?: NORMAL
MCHC RBC-ENTMCNC: 28.3 PG
MCHC RBC-ENTMCNC: 31.8 G/DL
MCV RBC AUTO: 89.2 FL
MONOCYTES # BLD AUTO: 0.45 K/UL
MONOCYTES NFR BLD AUTO: 5.6 %
NEUTROPHILS # BLD AUTO: 4.97 K/UL
NEUTROPHILS NFR BLD AUTO: 62.2 %
PLATELET # BLD AUTO: 249 K/UL
RBC # BLD: 4.8 M/UL
RBC # FLD: 13.3 %
WBC # FLD AUTO: 7.99 K/UL

## 2025-03-26 LAB
ALBUMIN SERPL ELPH-MCNC: 4.6 G/DL
ALP BLD-CCNC: 86 U/L
ALT SERPL-CCNC: 9 U/L
ANION GAP SERPL CALC-SCNC: 10 MMOL/L
AST SERPL-CCNC: 16 U/L
BILIRUB SERPL-MCNC: 0.2 MG/DL
BUN SERPL-MCNC: 20 MG/DL
CALCIUM SERPL-MCNC: 9.5 MG/DL
CHLORIDE SERPL-SCNC: 107 MMOL/L
CO2 SERPL-SCNC: 22 MMOL/L
CREAT SERPL-MCNC: 0.86 MG/DL
DHEA-S SERPL-MCNC: 354 UG/DL
EGFRCR SERPLBLD CKD-EPI 2021: 88 ML/MIN/1.73M2
ESTRADIOL SERPL-MCNC: 248 PG/ML
FSH SERPL-MCNC: 3.3 IU/L
GLUCOSE SERPL-MCNC: 100 MG/DL
INSULIN SERPL-MCNC: 25.9 UU/ML
LH SERPL-ACNC: 6.4 IU/L
POTASSIUM SERPL-SCNC: 4.4 MMOL/L
PROGEST SERPL-MCNC: 10.5 NG/ML
PROLACTIN SERPL-MCNC: 19.3 NG/ML
PROT SERPL-MCNC: 7.6 G/DL
SODIUM SERPL-SCNC: 140 MMOL/L
T4 FREE SERPL-MCNC: 1.2 NG/DL
TESTOST SERPL-MCNC: 50.3 NG/DL
TSH SERPL-ACNC: 1.21 UIU/ML